# Patient Record
Sex: MALE | Race: WHITE | Employment: FULL TIME | URBAN - NONMETROPOLITAN AREA
[De-identification: names, ages, dates, MRNs, and addresses within clinical notes are randomized per-mention and may not be internally consistent; named-entity substitution may affect disease eponyms.]

---

## 2022-10-15 ENCOUNTER — APPOINTMENT (OUTPATIENT)
Dept: CT IMAGING | Age: 62
End: 2022-10-15
Payer: COMMERCIAL

## 2022-10-15 ENCOUNTER — APPOINTMENT (OUTPATIENT)
Dept: GENERAL RADIOLOGY | Age: 62
End: 2022-10-15
Payer: COMMERCIAL

## 2022-10-15 ENCOUNTER — HOSPITAL ENCOUNTER (EMERGENCY)
Age: 62
Discharge: HOME | End: 2022-10-15
Attending: EMERGENCY MEDICINE
Payer: COMMERCIAL

## 2022-10-15 VITALS
OXYGEN SATURATION: 98 % | HEART RATE: 58 BPM | BODY MASS INDEX: 23.54 KG/M2 | SYSTOLIC BLOOD PRESSURE: 154 MMHG | TEMPERATURE: 98 F | HEIGHT: 67 IN | WEIGHT: 150 LBS | RESPIRATION RATE: 15 BRPM | DIASTOLIC BLOOD PRESSURE: 89 MMHG

## 2022-10-15 DIAGNOSIS — R10.9 FLANK PAIN: Primary | ICD-10-CM

## 2022-10-15 LAB
ANION GAP SERPL CALCULATED.3IONS-SCNC: 11 MEQ/L (ref 8–16)
BASOPHILS # BLD: 0.5 %
BASOPHILS ABSOLUTE: 0 THOU/MM3 (ref 0–0.1)
BILIRUBIN URINE: NEGATIVE
BLOOD, URINE: NEGATIVE
BUN BLDV-MCNC: 7 MG/DL (ref 7–22)
CALCIUM SERPL-MCNC: 9.1 MG/DL (ref 8.5–10.5)
CHARACTER, URINE: CLEAR
CHLORIDE BLD-SCNC: 102 MEQ/L (ref 98–111)
CO2: 25 MEQ/L (ref 23–33)
COLOR: YELLOW
CREAT SERPL-MCNC: 0.5 MG/DL (ref 0.4–1.2)
EOSINOPHIL # BLD: 1.9 %
EOSINOPHILS ABSOLUTE: 0.1 THOU/MM3 (ref 0–0.4)
ERYTHROCYTE [DISTWIDTH] IN BLOOD BY AUTOMATED COUNT: 14.1 % (ref 11.5–14.5)
ERYTHROCYTE [DISTWIDTH] IN BLOOD BY AUTOMATED COUNT: 47.3 FL (ref 35–45)
GFR SERPL CREATININE-BSD FRML MDRD: > 90 ML/MIN/1.73M2
GLUCOSE BLD-MCNC: 131 MG/DL (ref 70–108)
GLUCOSE URINE: NEGATIVE MG/DL
HCT VFR BLD CALC: 46.7 % (ref 42–52)
HEMOGLOBIN: 16.2 GM/DL (ref 14–18)
IMMATURE GRANS (ABS): 0.02 THOU/MM3 (ref 0–0.07)
IMMATURE GRANULOCYTES: 0.3 %
KETONES, URINE: NEGATIVE
LEUKOCYTE ESTERASE, URINE: NEGATIVE
LYMPHOCYTES # BLD: 19.6 %
LYMPHOCYTES ABSOLUTE: 1.4 THOU/MM3 (ref 1–4.8)
MCH RBC QN AUTO: 31.5 PG (ref 26–33)
MCHC RBC AUTO-ENTMCNC: 34.7 GM/DL (ref 32.2–35.5)
MCV RBC AUTO: 90.9 FL (ref 80–94)
MONOCYTES # BLD: 10.5 %
MONOCYTES ABSOLUTE: 0.8 THOU/MM3 (ref 0.4–1.3)
NITRITE, URINE: NEGATIVE
NUCLEATED RED BLOOD CELLS: 0 /100 WBC
OSMOLALITY CALCULATION: 275.5 MOSMOL/KG (ref 275–300)
PH UA: 8 (ref 5–9)
PLATELET # BLD: 208 THOU/MM3 (ref 130–400)
PMV BLD AUTO: 10.9 FL (ref 9.4–12.4)
POTASSIUM REFLEX MAGNESIUM: 4 MEQ/L (ref 3.5–5.2)
PRO-BNP: 201.2 PG/ML (ref 0–900)
PROTEIN UA: NEGATIVE
RBC # BLD: 5.14 MILL/MM3 (ref 4.7–6.1)
SEG NEUTROPHILS: 67.2 %
SEGMENTED NEUTROPHILS ABSOLUTE COUNT: 4.9 THOU/MM3 (ref 1.8–7.7)
SODIUM BLD-SCNC: 138 MEQ/L (ref 135–145)
SPECIFIC GRAVITY, URINE: 1.01 (ref 1–1.03)
TROPONIN T: < 0.01 NG/ML
UROBILINOGEN, URINE: 0.2 EU/DL (ref 0–1)
WBC # BLD: 7.3 THOU/MM3 (ref 4.8–10.8)

## 2022-10-15 PROCEDURE — 96374 THER/PROPH/DIAG INJ IV PUSH: CPT

## 2022-10-15 PROCEDURE — 80048 BASIC METABOLIC PNL TOTAL CA: CPT

## 2022-10-15 PROCEDURE — 71045 X-RAY EXAM CHEST 1 VIEW: CPT

## 2022-10-15 PROCEDURE — 99285 EMERGENCY DEPT VISIT HI MDM: CPT

## 2022-10-15 PROCEDURE — 6360000002 HC RX W HCPCS: Performed by: STUDENT IN AN ORGANIZED HEALTH CARE EDUCATION/TRAINING PROGRAM

## 2022-10-15 PROCEDURE — 96372 THER/PROPH/DIAG INJ SC/IM: CPT

## 2022-10-15 PROCEDURE — 81003 URINALYSIS AUTO W/O SCOPE: CPT

## 2022-10-15 PROCEDURE — 6360000004 HC RX CONTRAST MEDICATION: Performed by: EMERGENCY MEDICINE

## 2022-10-15 PROCEDURE — 83880 ASSAY OF NATRIURETIC PEPTIDE: CPT

## 2022-10-15 PROCEDURE — 6370000000 HC RX 637 (ALT 250 FOR IP): Performed by: STUDENT IN AN ORGANIZED HEALTH CARE EDUCATION/TRAINING PROGRAM

## 2022-10-15 PROCEDURE — 84484 ASSAY OF TROPONIN QUANT: CPT

## 2022-10-15 PROCEDURE — 85025 COMPLETE CBC W/AUTO DIFF WBC: CPT

## 2022-10-15 PROCEDURE — 96376 TX/PRO/DX INJ SAME DRUG ADON: CPT

## 2022-10-15 PROCEDURE — 71275 CT ANGIOGRAPHY CHEST: CPT

## 2022-10-15 PROCEDURE — 96375 TX/PRO/DX INJ NEW DRUG ADDON: CPT

## 2022-10-15 PROCEDURE — 2580000003 HC RX 258: Performed by: STUDENT IN AN ORGANIZED HEALTH CARE EDUCATION/TRAINING PROGRAM

## 2022-10-15 RX ORDER — LIDOCAINE 4 G/G
1 PATCH TOPICAL DAILY
Qty: 30 PATCH | Refills: 0 | Status: SHIPPED | OUTPATIENT
Start: 2022-10-15 | End: 2022-11-14

## 2022-10-15 RX ORDER — MORPHINE SULFATE 4 MG/ML
4 INJECTION, SOLUTION INTRAMUSCULAR; INTRAVENOUS ONCE
Status: COMPLETED | OUTPATIENT
Start: 2022-10-15 | End: 2022-10-15

## 2022-10-15 RX ORDER — TIZANIDINE 2 MG/1
2 TABLET ORAL 3 TIMES DAILY PRN
Qty: 30 TABLET | Refills: 0 | Status: SHIPPED | OUTPATIENT
Start: 2022-10-15

## 2022-10-15 RX ORDER — 0.9 % SODIUM CHLORIDE 0.9 %
500 INTRAVENOUS SOLUTION INTRAVENOUS ONCE
Status: COMPLETED | OUTPATIENT
Start: 2022-10-15 | End: 2022-10-15

## 2022-10-15 RX ORDER — KETOROLAC TROMETHAMINE 30 MG/ML
30 INJECTION, SOLUTION INTRAMUSCULAR; INTRAVENOUS ONCE
Status: COMPLETED | OUTPATIENT
Start: 2022-10-15 | End: 2022-10-15

## 2022-10-15 RX ORDER — DEXAMETHASONE SODIUM PHOSPHATE 4 MG/ML
8 INJECTION, SOLUTION INTRA-ARTICULAR; INTRALESIONAL; INTRAMUSCULAR; INTRAVENOUS; SOFT TISSUE ONCE
Status: COMPLETED | OUTPATIENT
Start: 2022-10-15 | End: 2022-10-15

## 2022-10-15 RX ORDER — LIDOCAINE 4 G/G
1 PATCH TOPICAL DAILY
Status: DISCONTINUED | OUTPATIENT
Start: 2022-10-15 | End: 2022-10-15 | Stop reason: HOSPADM

## 2022-10-15 RX ORDER — ORPHENADRINE CITRATE 30 MG/ML
60 INJECTION INTRAMUSCULAR; INTRAVENOUS ONCE
Status: COMPLETED | OUTPATIENT
Start: 2022-10-15 | End: 2022-10-15

## 2022-10-15 RX ADMIN — DEXAMETHASONE SODIUM PHOSPHATE 8 MG: 4 INJECTION, SOLUTION INTRA-ARTICULAR; INTRALESIONAL; INTRAMUSCULAR; INTRAVENOUS; SOFT TISSUE at 20:08

## 2022-10-15 RX ADMIN — MORPHINE SULFATE 4 MG: 4 INJECTION, SOLUTION INTRAMUSCULAR; INTRAVENOUS at 20:13

## 2022-10-15 RX ADMIN — MORPHINE SULFATE 4 MG: 4 INJECTION, SOLUTION INTRAMUSCULAR; INTRAVENOUS at 18:54

## 2022-10-15 RX ADMIN — ORPHENADRINE CITRATE 60 MG: 30 INJECTION INTRAMUSCULAR; INTRAVENOUS at 20:09

## 2022-10-15 RX ADMIN — IOPAMIDOL 80 ML: 755 INJECTION, SOLUTION INTRAVENOUS at 19:30

## 2022-10-15 RX ADMIN — SODIUM CHLORIDE 500 ML: 9 INJECTION, SOLUTION INTRAVENOUS at 18:46

## 2022-10-15 RX ADMIN — KETOROLAC TROMETHAMINE 30 MG: 30 INJECTION, SOLUTION INTRAMUSCULAR; INTRAVENOUS at 20:10

## 2022-10-15 ASSESSMENT — ENCOUNTER SYMPTOMS
NAUSEA: 0
DIARRHEA: 0
SHORTNESS OF BREATH: 0
CONSTIPATION: 0
BACK PAIN: 1
PHOTOPHOBIA: 0
VOMITING: 0
ABDOMINAL PAIN: 0
ABDOMINAL DISTENTION: 0
COLOR CHANGE: 0
CHEST TIGHTNESS: 0

## 2022-10-15 ASSESSMENT — PAIN - FUNCTIONAL ASSESSMENT: PAIN_FUNCTIONAL_ASSESSMENT: 0-10

## 2022-10-15 ASSESSMENT — PAIN SCALES - GENERAL: PAINLEVEL_OUTOF10: 5

## 2022-10-15 NOTE — ED PROVIDER NOTES
[place] ENCOUNTER          Pt Name: Bonifacio Moyer  MRN: [id number]  [place] 1960  Date of evaluation: 10/15/2022  Treating Resident Physician: Mercy Obrien MD  Supervising Physician: Dana Cha COMPLAINT       Chief Complaint   Patient presents with    Flank Pain     Left side     History obtained from the patient. HISTORY OF PRESENT ILLNESS    HPI  Bonifacio Moyer is a 58 y.o. male who presents to the emergency department for evaluation of left flank pain. Patient states that he has a very physical job, notes that he had the left flank/back pain yesterday and today. States that pain is currently 5 out of 10, worse with any movement, not associated with any dysuria, frequency, urgency, pain is nonradiating. Pain has made moving around at home difficult. Patient is chronic smoker, does not regularly see the doctor. Does not know if he has any medical problems. The patient has no other acute complaints at this time. REVIEW OF SYSTEMS   Review of Systems   Constitutional:  Negative for fatigue and fever. HENT: Negative. Eyes:  Negative for photophobia and visual disturbance. Respiratory:  Negative for chest tightness and shortness of breath. Cardiovascular:  Negative for chest pain, palpitations and leg swelling. Gastrointestinal:  Negative for abdominal distention, abdominal pain, constipation, diarrhea, nausea and vomiting. Endocrine: Negative for polyphagia and polyuria. Genitourinary:  Positive for flank pain. Musculoskeletal:  Positive for back pain. Negative for arthralgias, gait problem, joint swelling, myalgias, neck pain and neck stiffness. Skin:  Negative for color change, rash and wound. Neurological:  Negative for dizziness, syncope, weakness, light-headedness and numbness. Hematological:  Negative for adenopathy. Does not bruise/bleed easily.         PAST MEDICAL AND SURGICAL HISTORY   History reviewed. No pertinent past medical history. History reviewed. No pertinent surgical history. MEDICATIONS   No current facility-administered medications for this encounter. Current Outpatient Medications:     tiZANidine (ZANAFLEX) 2 MG tablet, Take 1 tablet by mouth 3 times daily as needed (back pain), Disp: 30 tablet, Rfl: 0    lidocaine 4 % external patch, Place 1 patch onto the skin daily, Disp: 30 patch, Rfl: 0      SOCIAL HISTORY     Social History     Social History Narrative    Not on file     Social History     Tobacco Use    Smoking status: Every Day     Packs/day: 0.50     Types: Cigarettes   Substance Use Topics    Alcohol use: Yes     Alcohol/week: 3.0 standard drinks     Types: 3 Cans of beer per week     Comment: daily         ALLERGIES   No Known Allergies      FAMILY HISTORY   History reviewed. No pertinent family history. PREVIOUS RECORDS   Previous records reviewed: Medical, past surgical, medications, allergies        PHYSICAL EXAM     ED Triage Vitals [10/15/22 1811]   BP Temp Temp Source Heart Rate Resp SpO2 Height Weight   (!) 148/74 98 °F (36.7 °C) Oral 74 14 99 % 5' 7\" (1.702 m) 150 lb (68 kg)     Initial vital signs and nursing assessment reviewed and hypertensive. Body mass index is 23.49 kg/m². Pulsoximetry is normal per my interpretation. Additional Vital Signs:  Vitals:    10/15/22 1924   BP: (!) 154/89   Pulse: 58   Resp: 15   Temp:    SpO2: 98%       Physical Exam  Constitutional:       Appearance: Normal appearance. HENT:      Head: Normocephalic and atraumatic. Right Ear: External ear normal.      Left Ear: External ear normal.      Nose: Nose normal.      Mouth/Throat:      Mouth: Mucous membranes are moist.      Pharynx: Oropharynx is clear. Eyes:      Extraocular Movements: Extraocular movements intact. Conjunctiva/sclera: Conjunctivae normal.      Pupils: Pupils are equal, round, and reactive to light.    Cardiovascular:      Rate and Rhythm: Normal rate and regular rhythm. Pulses: Normal pulses. Heart sounds: Normal heart sounds. Pulmonary:      Effort: Pulmonary effort is normal. No respiratory distress. Breath sounds: Normal breath sounds. No wheezing or rales. Chest:      Chest wall: No tenderness. Abdominal:      General: Abdomen is flat. Bowel sounds are normal. There is no distension. Palpations: Abdomen is soft. There is no mass. Tenderness: There is no abdominal tenderness. There is no right CVA tenderness, left CVA tenderness, guarding or rebound. Musculoskeletal:         General: Normal range of motion. Cervical back: Normal range of motion and neck supple. Right lower leg: No edema. Left lower leg: No edema. Skin:     General: Skin is warm and dry. Capillary Refill: Capillary refill takes less than 2 seconds. Findings: No rash. Neurological:      General: No focal deficit present. Mental Status: He is alert and oriented to person, place, and time. Gait: Gait normal.            MEDICAL DECISION MAKING   Initial Assessment:   Is a 49-year-old male, presenting for left-sided back pain, smoker, removed from medical care. Physical exam significant for hypertension, no CVA tenderness. Differential diagnoses include not limited to nephrolithiasis, pyelonephritis, muscle strain, aortic dissection, PE  Plan:   CBC, BMP, troponin, EKG, CTA chest  Labs grossly unremarkable. CTA chest shows old fracture of the left in the posterior rib, which is exactly where the patient is pointing to saying the worst of his pain is. Pain resolved after Toradol, Norflex, Decadron, lidocaine patch  Discharged home with strict return precautions, follow-up.         ED RESULTS   Laboratory results:  Labs Reviewed   CBC WITH AUTO DIFFERENTIAL - Abnormal; Notable for the following components:       Result Value    RDW-SD 47.3 (*)     All other components within normal limits   BASIC METABOLIC PANEL W/ REFLEX TO MG FOR LOW K - Abnormal; Notable for the following components:    Glucose 131 (*)     All other components within normal limits   URINALYSIS WITH REFLEX TO CULTURE   BRAIN NATRIURETIC PEPTIDE   TROPONIN   ANION GAP   GLOMERULAR FILTRATION RATE, ESTIMATED   OSMOLALITY       Radiologic studies results:  CTA CHEST W WO CONTRAST - r/o Aortic Dissection   Final Result       1. There is an old appearing partially healed fracture of the left 11th rib. 2. There is no aortic aneurysm or dissection. 3. No pulmonary embolism or infiltrate. **This report has been created using voice recognition software. It may contain minor errors which are inherent in voice recognition technology. **      Final report electronically signed by Dr Starr Suarez on 10/15/2022 7:47 PM      XR CHEST PORTABLE   Final Result   There is no acute intrathoracic process. **This report has been created using voice recognition software. It may contain minor errors which are inherent in voice recognition technology. **      Final report electronically signed by Dr Starr Suarez on 10/15/2022 6:52 PM          ED Medications administered this visit:   Medications   0.9 % sodium chloride bolus (0 mLs IntraVENous Stopped 10/15/22 2015)   morphine injection 4 mg (4 mg IntraVENous Given 10/15/22 1854)   iopamidol (ISOVUE-370) 76 % injection 80 mL (80 mLs IntraVENous Given 10/15/22 1930)   ketorolac (TORADOL) injection 30 mg (30 mg IntraVENous Given 10/15/22 2010)   orphenadrine (NORFLEX) injection 60 mg (60 mg IntraMUSCular Given 10/15/22 2009)   dexamethasone (DECADRON) injection 8 mg (8 mg IntraVENous Given 10/15/22 2008)   morphine injection 4 mg (4 mg IntraVENous Given 10/15/22 2013)         ED COURSE         Strict return precautions and follow up instructions were discussed with the patient prior to discharge, with which the patient agrees.       MEDICATION CHANGES     Discharge Medication

## 2022-10-15 NOTE — ED TRIAGE NOTES
Pt to the ED via lobby with complaint of left flank pain that started yesterday about noon. Pt stated he took Asprin a few times today that helped a little with the pain. Pt stated that yesterday he bent over to tie his shoes a vomited. Pt alert and oriented x3.

## 2022-10-15 NOTE — ED NOTES
Pt resting quietly in room no needs expressed. Side rails up x2 with call light in reach.        Gabriel Crow RN  10/15/22 1925

## 2022-10-16 NOTE — DISCHARGE INSTRUCTIONS
Seen for flank pain. At this time you are stable for discharge. Please follow-up with your primary care provider within the next couple days. Please return to the ED if any worsening of condition.

## 2022-10-19 NOTE — ED PROVIDER NOTES
[address], Pt Name: Selwyn Sun  MRN: [id number]  [place] 1960  Date of evaluation: 9/12/20      I personally saw and examined the patient. I have reviewed and agree with the Resident findings, including all diagnostic interpretations and treatment plans as written. I was present for the key portion of any procedures performed and the inclusive time noted in any critical care statement. History: This patient was seen with Vidal Bhatia, resident physician. This Is a 75-year-old male with very localized pain over the left posterior chest.  We obtained CT imaging to rule out dissection there is no evidence of dissection and what was found was a partially healed fracture of the left 11th rib and that appears to correlate with exactly where the patient's pain is. Is very localized there. 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 But in any case has no evidence of pneumothorax or any acute surgical emergency on his imaging. We have addressed his pain and will be discharged.               Leticia Wesley,   10/19/22 6639

## 2023-01-27 ENCOUNTER — APPOINTMENT (OUTPATIENT)
Dept: GENERAL RADIOLOGY | Age: 63
End: 2023-01-27
Payer: COMMERCIAL

## 2023-01-27 ENCOUNTER — APPOINTMENT (OUTPATIENT)
Dept: CT IMAGING | Age: 63
End: 2023-01-27
Payer: COMMERCIAL

## 2023-01-27 ENCOUNTER — HOSPITAL ENCOUNTER (INPATIENT)
Age: 63
LOS: 1 days | Discharge: HOME | End: 2023-01-28
Attending: EMERGENCY MEDICINE | Admitting: INTERNAL MEDICINE
Payer: COMMERCIAL

## 2023-01-27 DIAGNOSIS — I63.512 CEREBROVASCULAR ACCIDENT (CVA) DUE TO STENOSIS OF LEFT MIDDLE CEREBRAL ARTERY (HCC): Primary | ICD-10-CM

## 2023-01-27 DIAGNOSIS — I66.9 STENOSIS OF INTRACRANIAL VESSEL: ICD-10-CM

## 2023-01-27 PROBLEM — I63.9 ACUTE CVA (CEREBROVASCULAR ACCIDENT) (HCC): Status: ACTIVE | Noted: 2023-01-27

## 2023-01-27 LAB
ALBUMIN SERPL BCG-MCNC: 4.4 G/DL (ref 3.5–5.1)
ALP SERPL-CCNC: 67 U/L (ref 38–126)
ALT SERPL W/O P-5'-P-CCNC: 18 U/L (ref 11–66)
ANION GAP SERPL CALC-SCNC: 13 MEQ/L (ref 8–16)
AST SERPL-CCNC: 27 U/L (ref 5–40)
BASOPHILS ABSOLUTE: 0.1 THOU/MM3 (ref 0–0.1)
BASOPHILS NFR BLD AUTO: 0.8 %
BILIRUB SERPL-MCNC: 0.5 MG/DL (ref 0.3–1.2)
BUN SERPL-MCNC: 12 MG/DL (ref 7–22)
CALCIUM SERPL-MCNC: 9.4 MG/DL (ref 8.5–10.5)
CHLORIDE SERPL-SCNC: 104 MEQ/L (ref 98–111)
CO2 SERPL-SCNC: 24 MEQ/L (ref 23–33)
CREAT SERPL-MCNC: 0.5 MG/DL (ref 0.4–1.2)
DEPRECATED RDW RBC AUTO: 43.9 FL (ref 35–45)
EOSINOPHIL NFR BLD AUTO: 6.4 %
EOSINOPHILS ABSOLUTE: 0.5 THOU/MM3 (ref 0–0.4)
ERYTHROCYTE [DISTWIDTH] IN BLOOD BY AUTOMATED COUNT: 13.1 % (ref 11.5–14.5)
GFR SERPL CREATININE-BSD FRML MDRD: > 60 ML/MIN/1.73M2
GLUCOSE BLD STRIP.AUTO-MCNC: 206 MG/DL (ref 70–108)
GLUCOSE SERPL-MCNC: 163 MG/DL (ref 70–108)
HCT VFR BLD AUTO: 43.9 % (ref 42–52)
HGB BLD-MCNC: 14.7 GM/DL (ref 14–18)
IMM GRANULOCYTES # BLD AUTO: 0.01 THOU/MM3 (ref 0–0.07)
IMM GRANULOCYTES NFR BLD AUTO: 0.1 %
INR PPP: 0.86 (ref 0.85–1.13)
LYMPHOCYTES ABSOLUTE: 2.7 THOU/MM3 (ref 1–4.8)
LYMPHOCYTES NFR BLD AUTO: 36.1 %
MCH RBC QN AUTO: 30.6 PG (ref 26–33)
MCHC RBC AUTO-ENTMCNC: 33.5 GM/DL (ref 32.2–35.5)
MCV RBC AUTO: 91.5 FL (ref 80–94)
MONOCYTES ABSOLUTE: 0.8 THOU/MM3 (ref 0.4–1.3)
MONOCYTES NFR BLD AUTO: 10.2 %
NEUTROPHILS NFR BLD AUTO: 46.4 %
NRBC BLD AUTO-RTO: 0 /100 WBC
OSMOLALITY SERPL CALC.SUM OF ELEC: 284.6 MOSMOL/KG (ref 275–300)
PLATELET # BLD AUTO: 179 THOU/MM3 (ref 130–400)
PMV BLD AUTO: 11 FL (ref 9.4–12.4)
POC CREATININE WHOLE BLOOD: 0.6 MG/DL (ref 0.5–1.2)
POTASSIUM SERPL-SCNC: 3.8 MEQ/L (ref 3.5–5.2)
PROT SERPL-MCNC: 6.7 G/DL (ref 6.1–8)
RBC # BLD AUTO: 4.8 MILL/MM3 (ref 4.7–6.1)
SEGMENTED NEUTROPHILS ABSOLUTE COUNT: 3.4 THOU/MM3 (ref 1.8–7.7)
SODIUM SERPL-SCNC: 141 MEQ/L (ref 135–145)
TROPONIN T: < 0.01 NG/ML
WBC # BLD AUTO: 7.4 THOU/MM3 (ref 4.8–10.8)

## 2023-01-27 PROCEDURE — 84484 ASSAY OF TROPONIN QUANT: CPT

## 2023-01-27 PROCEDURE — 70496 CT ANGIOGRAPHY HEAD: CPT

## 2023-01-27 PROCEDURE — 85025 COMPLETE CBC W/AUTO DIFF WBC: CPT

## 2023-01-27 PROCEDURE — 6370000000 HC RX 637 (ALT 250 FOR IP): Performed by: EMERGENCY MEDICINE

## 2023-01-27 PROCEDURE — 82565 ASSAY OF CREATININE: CPT

## 2023-01-27 PROCEDURE — 99285 EMERGENCY DEPT VISIT HI MDM: CPT

## 2023-01-27 PROCEDURE — 80053 COMPREHEN METABOLIC PANEL: CPT

## 2023-01-27 PROCEDURE — 70450 CT HEAD/BRAIN W/O DYE: CPT

## 2023-01-27 PROCEDURE — 82948 REAGENT STRIP/BLOOD GLUCOSE: CPT

## 2023-01-27 PROCEDURE — 85610 PROTHROMBIN TIME: CPT

## 2023-01-27 PROCEDURE — 36415 COLL VENOUS BLD VENIPUNCTURE: CPT

## 2023-01-27 PROCEDURE — 6360000004 HC RX CONTRAST MEDICATION: Performed by: EMERGENCY MEDICINE

## 2023-01-27 PROCEDURE — 93005 ELECTROCARDIOGRAM TRACING: CPT | Performed by: EMERGENCY MEDICINE

## 2023-01-27 PROCEDURE — 2580000003 HC RX 258: Performed by: EMERGENCY MEDICINE

## 2023-01-27 PROCEDURE — 71045 X-RAY EXAM CHEST 1 VIEW: CPT

## 2023-01-27 PROCEDURE — 70498 CT ANGIOGRAPHY NECK: CPT

## 2023-01-27 PROCEDURE — 2060000000 HC ICU INTERMEDIATE R&B

## 2023-01-27 RX ORDER — ACETAMINOPHEN 650 MG/1
650 SUPPOSITORY RECTAL EVERY 4 HOURS PRN
Status: DISCONTINUED | OUTPATIENT
Start: 2023-01-27 | End: 2023-01-28 | Stop reason: HOSPADM

## 2023-01-27 RX ORDER — 0.9 % SODIUM CHLORIDE 0.9 %
1000 INTRAVENOUS SOLUTION INTRAVENOUS ONCE
Status: COMPLETED | OUTPATIENT
Start: 2023-01-27 | End: 2023-01-27

## 2023-01-27 RX ORDER — POLYETHYLENE GLYCOL 3350 17 G/17G
17 POWDER, FOR SOLUTION ORAL DAILY PRN
Status: DISCONTINUED | OUTPATIENT
Start: 2023-01-27 | End: 2023-01-28 | Stop reason: HOSPADM

## 2023-01-27 RX ORDER — ACETAMINOPHEN 325 MG/1
650 TABLET ORAL EVERY 4 HOURS PRN
Status: DISCONTINUED | OUTPATIENT
Start: 2023-01-27 | End: 2023-01-28 | Stop reason: HOSPADM

## 2023-01-27 RX ORDER — CLOPIDOGREL BISULFATE 75 MG/1
75 TABLET ORAL DAILY
Status: DISCONTINUED | OUTPATIENT
Start: 2023-01-28 | End: 2023-01-28 | Stop reason: HOSPADM

## 2023-01-27 RX ORDER — ATORVASTATIN CALCIUM 80 MG/1
80 TABLET, FILM COATED ORAL NIGHTLY
Status: DISCONTINUED | OUTPATIENT
Start: 2023-01-28 | End: 2023-01-28 | Stop reason: HOSPADM

## 2023-01-27 RX ORDER — ONDANSETRON 4 MG/1
4 TABLET, ORALLY DISINTEGRATING ORAL EVERY 8 HOURS PRN
Status: DISCONTINUED | OUTPATIENT
Start: 2023-01-27 | End: 2023-01-28 | Stop reason: HOSPADM

## 2023-01-27 RX ORDER — ASPIRIN 81 MG/1
324 TABLET, CHEWABLE ORAL ONCE
Status: COMPLETED | OUTPATIENT
Start: 2023-01-27 | End: 2023-01-27

## 2023-01-27 RX ORDER — CLOPIDOGREL 300 MG/1
300 TABLET, FILM COATED ORAL ONCE
Status: COMPLETED | OUTPATIENT
Start: 2023-01-27 | End: 2023-01-27

## 2023-01-27 RX ORDER — SODIUM CHLORIDE 9 MG/ML
INJECTION, SOLUTION INTRAVENOUS CONTINUOUS
Status: DISCONTINUED | OUTPATIENT
Start: 2023-01-28 | End: 2023-01-28 | Stop reason: HOSPADM

## 2023-01-27 RX ORDER — ENOXAPARIN SODIUM 100 MG/ML
40 INJECTION SUBCUTANEOUS DAILY
Status: DISCONTINUED | OUTPATIENT
Start: 2023-01-28 | End: 2023-01-28 | Stop reason: HOSPADM

## 2023-01-27 RX ORDER — ONDANSETRON 2 MG/ML
4 INJECTION INTRAMUSCULAR; INTRAVENOUS EVERY 6 HOURS PRN
Status: DISCONTINUED | OUTPATIENT
Start: 2023-01-27 | End: 2023-01-28 | Stop reason: HOSPADM

## 2023-01-27 RX ADMIN — CLOPIDOGREL BISULFATE 300 MG: 300 TABLET, FILM COATED ORAL at 21:48

## 2023-01-27 RX ADMIN — IOPAMIDOL 80 ML: 755 INJECTION, SOLUTION INTRAVENOUS at 21:34

## 2023-01-27 RX ADMIN — ASPIRIN 81 MG 324 MG: 81 TABLET ORAL at 21:48

## 2023-01-27 RX ADMIN — SODIUM CHLORIDE 1000 ML: 9 INJECTION, SOLUTION INTRAVENOUS at 21:30

## 2023-01-27 ASSESSMENT — ENCOUNTER SYMPTOMS
RHINORRHEA: 1
EYES NEGATIVE: 1
COUGH: 1
SINUS PRESSURE: 1
GASTROINTESTINAL NEGATIVE: 1
ALLERGIC/IMMUNOLOGIC NEGATIVE: 1

## 2023-01-27 ASSESSMENT — PAIN - FUNCTIONAL ASSESSMENT: PAIN_FUNCTIONAL_ASSESSMENT: NONE - DENIES PAIN

## 2023-01-28 ENCOUNTER — APPOINTMENT (OUTPATIENT)
Dept: MRI IMAGING | Age: 63
End: 2023-01-28
Payer: COMMERCIAL

## 2023-01-28 VITALS
HEART RATE: 75 BPM | RESPIRATION RATE: 16 BRPM | HEIGHT: 67 IN | DIASTOLIC BLOOD PRESSURE: 86 MMHG | OXYGEN SATURATION: 96 % | TEMPERATURE: 98.4 F | WEIGHT: 165.7 LBS | BODY MASS INDEX: 26.01 KG/M2 | SYSTOLIC BLOOD PRESSURE: 128 MMHG

## 2023-01-28 PROBLEM — I63.9 ACUTE CVA (CEREBROVASCULAR ACCIDENT) (HCC): Status: RESOLVED | Noted: 2023-01-27 | Resolved: 2023-01-28

## 2023-01-28 PROBLEM — G45.9 TIA (TRANSIENT ISCHEMIC ATTACK): Status: ACTIVE | Noted: 2023-01-28

## 2023-01-28 LAB
CHOLEST SERPL-MCNC: 185 MG/DL (ref 100–199)
CRP SERPL-MCNC: < 0.3 MG/DL (ref 0–1)
DEPRECATED MEAN GLUCOSE BLD GHB EST-ACNC: 111 MG/DL (ref 70–126)
DEPRECATED RDW RBC AUTO: 45.9 FL (ref 35–45)
EKG ATRIAL RATE: 83 BPM
EKG P AXIS: 12 DEGREES
EKG P-R INTERVAL: 144 MS
EKG Q-T INTERVAL: 390 MS
EKG QRS DURATION: 86 MS
EKG QTC CALCULATION (BAZETT): 458 MS
EKG R AXIS: 18 DEGREES
EKG T AXIS: 65 DEGREES
EKG VENTRICULAR RATE: 83 BPM
ERYTHROCYTE [DISTWIDTH] IN BLOOD BY AUTOMATED COUNT: 13.2 % (ref 11.5–14.5)
ERYTHROCYTE [SEDIMENTATION RATE] IN BLOOD BY WESTERGREN METHOD: 3 MM/HR (ref 0–10)
FLUAV RNA RESP QL NAA+PROBE: NOT DETECTED
FLUBV RNA RESP QL NAA+PROBE: NOT DETECTED
GLUCOSE BLD STRIP.AUTO-MCNC: 112 MG/DL (ref 70–108)
GLUCOSE BLD STRIP.AUTO-MCNC: 133 MG/DL (ref 70–108)
GLUCOSE BLD STRIP.AUTO-MCNC: 157 MG/DL (ref 70–108)
GLUCOSE BLD STRIP.AUTO-MCNC: 96 MG/DL (ref 70–108)
HBA1C MFR BLD HPLC: 5.7 % (ref 4.4–6.4)
HCT VFR BLD AUTO: 42.9 % (ref 42–52)
HDLC SERPL-MCNC: 97 MG/DL
HGB BLD-MCNC: 13.7 GM/DL (ref 14–18)
IRON SERPL-MCNC: 96 UG/DL (ref 65–195)
LDLC SERPL CALC-MCNC: 75 MG/DL
LV EF: 63 %
LVEF MODALITY: NORMAL
MCH RBC QN AUTO: 30.4 PG (ref 26–33)
MCHC RBC AUTO-ENTMCNC: 31.9 GM/DL (ref 32.2–35.5)
MCV RBC AUTO: 95.3 FL (ref 80–94)
PLATELET # BLD AUTO: 140 THOU/MM3 (ref 130–400)
PMV BLD AUTO: 10.5 FL (ref 9.4–12.4)
RBC # BLD AUTO: 4.5 MILL/MM3 (ref 4.7–6.1)
SARS-COV-2 RNA RESP QL NAA+PROBE: NOT DETECTED
TIBC SERPL-MCNC: 305 UG/DL (ref 171–450)
TRIGL SERPL-MCNC: 65 MG/DL (ref 0–199)
WBC # BLD AUTO: 5.3 THOU/MM3 (ref 4.8–10.8)

## 2023-01-28 PROCEDURE — 92523 SPEECH SOUND LANG COMPREHEN: CPT

## 2023-01-28 PROCEDURE — 83550 IRON BINDING TEST: CPT

## 2023-01-28 PROCEDURE — 36415 COLL VENOUS BLD VENIPUNCTURE: CPT

## 2023-01-28 PROCEDURE — 97535 SELF CARE MNGMENT TRAINING: CPT

## 2023-01-28 PROCEDURE — 85027 COMPLETE CBC AUTOMATED: CPT

## 2023-01-28 PROCEDURE — 83516 IMMUNOASSAY NONANTIBODY: CPT

## 2023-01-28 PROCEDURE — 82948 REAGENT STRIP/BLOOD GLUCOSE: CPT

## 2023-01-28 PROCEDURE — 87636 SARSCOV2 & INF A&B AMP PRB: CPT

## 2023-01-28 PROCEDURE — 83540 ASSAY OF IRON: CPT

## 2023-01-28 PROCEDURE — 93270 REMOTE 30 DAY ECG REV/REPORT: CPT

## 2023-01-28 PROCEDURE — 97530 THERAPEUTIC ACTIVITIES: CPT

## 2023-01-28 PROCEDURE — 97165 OT EVAL LOW COMPLEX 30 MIN: CPT

## 2023-01-28 PROCEDURE — 6360000002 HC RX W HCPCS: Performed by: PHYSICIAN ASSISTANT

## 2023-01-28 PROCEDURE — 86255 FLUORESCENT ANTIBODY SCREEN: CPT

## 2023-01-28 PROCEDURE — 6370000000 HC RX 637 (ALT 250 FOR IP): Performed by: PHYSICIAN ASSISTANT

## 2023-01-28 PROCEDURE — 6370000000 HC RX 637 (ALT 250 FOR IP): Performed by: NURSE PRACTITIONER

## 2023-01-28 PROCEDURE — 70551 MRI BRAIN STEM W/O DYE: CPT

## 2023-01-28 PROCEDURE — 80061 LIPID PANEL: CPT

## 2023-01-28 PROCEDURE — 83036 HEMOGLOBIN GLYCOSYLATED A1C: CPT

## 2023-01-28 PROCEDURE — 2580000003 HC RX 258: Performed by: PHYSICIAN ASSISTANT

## 2023-01-28 PROCEDURE — 93306 TTE W/DOPPLER COMPLETE: CPT

## 2023-01-28 PROCEDURE — 92610 EVALUATE SWALLOWING FUNCTION: CPT

## 2023-01-28 PROCEDURE — 85651 RBC SED RATE NONAUTOMATED: CPT

## 2023-01-28 PROCEDURE — 86038 ANTINUCLEAR ANTIBODIES: CPT

## 2023-01-28 PROCEDURE — 86140 C-REACTIVE PROTEIN: CPT

## 2023-01-28 RX ORDER — INSULIN LISPRO 100 [IU]/ML
0-4 INJECTION, SOLUTION INTRAVENOUS; SUBCUTANEOUS NIGHTLY
Status: DISCONTINUED | OUTPATIENT
Start: 2023-01-28 | End: 2023-01-28 | Stop reason: HOSPADM

## 2023-01-28 RX ORDER — ASPIRIN 81 MG/1
81 TABLET, CHEWABLE ORAL DAILY
Status: DISCONTINUED | OUTPATIENT
Start: 2023-01-28 | End: 2023-01-28

## 2023-01-28 RX ORDER — ASPIRIN 81 MG/1
324 TABLET, CHEWABLE ORAL DAILY
Status: DISCONTINUED | OUTPATIENT
Start: 2023-01-29 | End: 2023-01-28 | Stop reason: HOSPADM

## 2023-01-28 RX ORDER — CLOPIDOGREL BISULFATE 75 MG/1
75 TABLET ORAL DAILY
Qty: 30 TABLET | Refills: 3 | Status: SHIPPED | OUTPATIENT
Start: 2023-01-29

## 2023-01-28 RX ORDER — INSULIN LISPRO 100 [IU]/ML
0-4 INJECTION, SOLUTION INTRAVENOUS; SUBCUTANEOUS
Status: DISCONTINUED | OUTPATIENT
Start: 2023-01-28 | End: 2023-01-28 | Stop reason: HOSPADM

## 2023-01-28 RX ORDER — ATORVASTATIN CALCIUM 80 MG/1
80 TABLET, FILM COATED ORAL NIGHTLY
Qty: 30 TABLET | Refills: 3 | Status: SHIPPED | OUTPATIENT
Start: 2023-01-28

## 2023-01-28 RX ORDER — DEXTROSE MONOHYDRATE 100 MG/ML
INJECTION, SOLUTION INTRAVENOUS CONTINUOUS PRN
Status: DISCONTINUED | OUTPATIENT
Start: 2023-01-28 | End: 2023-01-28 | Stop reason: HOSPADM

## 2023-01-28 RX ORDER — ASPIRIN 81 MG/1
324 TABLET, CHEWABLE ORAL DAILY
Qty: 30 TABLET | Refills: 3 | Status: SHIPPED | OUTPATIENT
Start: 2023-01-29

## 2023-01-28 RX ORDER — NICOTINE 21 MG/24HR
1 PATCH, TRANSDERMAL 24 HOURS TRANSDERMAL DAILY
Status: DISCONTINUED | OUTPATIENT
Start: 2023-01-28 | End: 2023-01-28 | Stop reason: HOSPADM

## 2023-01-28 RX ORDER — ASPIRIN 81 MG/1
81 TABLET, CHEWABLE ORAL DAILY
Qty: 30 TABLET | Refills: 3 | Status: SHIPPED | OUTPATIENT
Start: 2023-01-29 | End: 2023-01-28 | Stop reason: HOSPADM

## 2023-01-28 RX ADMIN — ENOXAPARIN SODIUM 40 MG: 100 INJECTION SUBCUTANEOUS at 08:24

## 2023-01-28 RX ADMIN — ATORVASTATIN CALCIUM 80 MG: 80 TABLET, FILM COATED ORAL at 01:46

## 2023-01-28 RX ADMIN — CLOPIDOGREL BISULFATE 75 MG: 75 TABLET ORAL at 08:24

## 2023-01-28 RX ADMIN — ASPIRIN 81 MG: 81 TABLET, CHEWABLE ORAL at 10:37

## 2023-01-28 RX ADMIN — SODIUM CHLORIDE: 9 INJECTION, SOLUTION INTRAVENOUS at 01:50

## 2023-01-28 ASSESSMENT — ENCOUNTER SYMPTOMS
COUGH: 1
NAUSEA: 0
EYES NEGATIVE: 1
SORE THROAT: 0
PHOTOPHOBIA: 0
ALLERGIC/IMMUNOLOGIC NEGATIVE: 1
ABDOMINAL PAIN: 0
GASTROINTESTINAL NEGATIVE: 1
RHINORRHEA: 0
VOMITING: 0
RHINORRHEA: 1
COUGH: 0
SINUS PRESSURE: 1
SHORTNESS OF BREATH: 0

## 2023-01-28 ASSESSMENT — 9 HOLE PEG TEST
TESTTIME_SECONDS: 19.83
TEST_RESULT: FUNCTIONAL
TESTTIME_SECONDS: 26.6
TEST_RESULT: FUNCTIONAL

## 2023-01-28 NOTE — PROGRESS NOTES
[place] [address] 60  PHYSICAL THERAPY MISSED TREATMENT NOTE  [facility]    Date: 2023  Patient Name: Massimo Velez        MRN: [id number]   : 1960  (58 y.o.)  Gender: male            Missed Treat    REASON FOR MISSED TREATMENT:  Missed Treat. Per conversation with pt, RN, and OT following her evaluation, pt is moving at his baseline level of mobility. He denies PT needs, and is to be discharged soon. PT will defer evaluation and sign off at this time. Please place new orders if needs arise.      Timmy Gilford PT, DPT

## 2023-01-28 NOTE — PROGRESS NOTES
[facility] [place]  INPATIENT OCCUPATIONAL THERAPY  [facility] NEUROSCIENCES 4A  EVALUATION    Time:   Time In: 740  Time Out: 813  Timed Code Treatment Minutes: 23 Minutes  Minutes: 33          Date: 2023  Patient Name: Fozia Almazan,   Gender: male      MRN: [id number]  : 1960  (58 y.o.)  Referring Practitioner: Gloria De PA-C  Diagnosis: acute CVA  Additional Pertinent Hx: Patient is a 58 y.o. male with no past medical history who presents for evaluation of slurred speech. Patient states that he had right-sided numbness/weakness and facial droop today at 1000 which resolved after a couple hours and 650 mg home aspirin. He noticed a recurrence of symptoms at  which included mumbled speech, right-sided facial droop, and general weakness but no discernable sided extremity deficit. Restrictions/Precautions:  Restrictions/Precautions: Up as Tolerated, Fall Risk    Subjective  Chart Reviewed: Yes, Orders, Progress Notes    Subjective: Nurse approved OT evaluation. Pt in bed on OT arrival with significant other at bedside and supportive. Pt is alert and oriented x4, pleasant and cooperative.  Pt reports he feels like his symptoms have subsided    Pain: denies    Vitals: Vitals not assessed per clinical judgement, see nursing flowsheet    Social/Functional History:  Lives With: Family  Type of Home: House  Home Layout: Two level, Performs ADL's on one level  Home Access: Stairs to enter without rails  Entrance Stairs - Number of Steps: 2 SHAINA  Home Equipment: Pamela Sendy, rolling, Cane   Bathroom Shower/Tub: Walk-in shower  Bathroom Toilet: Handicap height  Bathroom Equipment: Grab bars in shower       ADL Assistance: Independent  Homemaking Assistance: Independent  Ambulation Assistance: Independent  Transfer Assistance: Independent    Active : Yes  Occupation: Full time employment  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 Ambulates without a device HH and community distances. VISION:WFL    HEARING:  WFL    COGNITION: WFL    RANGE OF MOTION:  Bilateral Upper Extremity:  WNL    STRENGTH:  Right Upper Extremity: WNL  Left Upper Extremity:  WNL    HAND ASSESSMENT    Hand Dominance: Right  Left Hand Strength -  (lbs)  Handle Setting 2: 1/28: 96#, 95#, 97#  Right Hand Strength -  (lbs)  Handle Setting 2: 1/28: 100#, 99#, 98#  Fine Motor Skills  Left 9-Hole Peg Test: Functional  Left 9 Hole Peg Test Time (secs): 26.6  Right 9-Hole Peg Test: Functional  Right 9 Hole Peg Test Time (secs): 19.83       SENSATION:   WFL, even on both sides    ADL:   Grooming: Independent. In standing at the sink   Bathing: Independent. In standing at the sink for sink bath   Upper Extremity Dressing: Independent. Lower Extremity Dressing: Independent. Footwear Management: Independent. Sitting EOB to don socks  Toileting: Independent. Toilet Transfer: Independent. Belén Castro BALANCE:  Sitting Balance:  Independent. Standing Balance: Modified Independent. BED MOBILITY:  Supine to Sit: Independent    Sit to Supine: Independent      TRANSFERS:  Sit to Stand:  Independent. Stand to Sit: Independent. FUNCTIONAL MOBILITY:  Assistive Device: None  Assist Level: Independent. Distance: To and from bathroom and HH distances           Activity Tolerance:  Patient tolerance of  treatment: good. Assessment:  Pt presents at or near baseline level of function. Pt verbalizes no apparent deficits at this time. Pt reports he feels like his FM and strength has returned to normal. Pt condones he feels his balance is normal.  No continued OT services at this time. Pt is independent with all ADLs. Please re-consult if needed for re-assessment.    Prognosis: Good  REQUIRES OT FOLLOW-UP: No  No Skilled OT: Independent with functional mobility, Independent with ADL's, Safe to return home, At baseline function  Decision Making: Low Complexity    Treatment Initiated: Treatment and education initiated within context of evaluation. Evaluation time included review of current medical information, gathering information related to past medical, social and functional history, completion of standardized testing, formal and informal observation of tasks, assessment of data and development of plan of care and goals. Treatment time included skilled education and facilitation of tasks to increase safety and independence with ADL's for improved functional independence and quality of life. Discharge Recommendations:  Home with assist PRN    Patient Education:     Patient Education  Education Given To: Patient  Education Provided: Role of Therapy, Plan of Care, Fall Prevention Strategies  Education Method: Demonstration, Verbal  Barriers to Learning: None  Education Outcome: Verbalized understanding, Demonstrated understanding    Equipment Recommendations:  Equipment Needed: No    Plan:  Times Per Week: 1 time  Times Per Day: Once a day  Current Treatment Recommendations: Balance training, Functional mobility training, Self-Care / ADL. See long-term goal time frame for expected duration of plan of care. If no long-term goals established, a short length of stay is anticipated. Goals:  Patient goals : To return home with sister  Short Term Goals  Time Frame for Short Term Goals: until discharge  Short Term Goal 1: Pt will be Modified independent with all ADLs. Short Term Goal 2: Pt will safely navigate to/from bathroom and [facility] distances with Modified [place]. Following session, patient left in safe position with all fall risk precautions in place.

## 2023-01-28 NOTE — PROGRESS NOTES
[facility] NEUROSCIENCES 4A  Speech - Language - Cognitive Evaluation + Clinical Swallow Evaluation    SLP Individual Minutes  Time In: 0391  Time Out: 4707  Minutes: 23  Timed Code Treatment Minutes: 0 Minutes     Speech, Language, Cognitive Evaluation: 15 minutes  Clinical Swallow Evaluation: 8 minutes    Date: 2023  Patient Name: Deborah Arreola      CSN: [id number]   : 1960  (58 y.o.)  Gender: male   Referring Physician:  Kelly Hankins PA-C  Diagnosis: Acute CVA  Precautions: Fall Risk  History of Present Illness/Injury: Patient admitted to [facility] with above diagnosis; please see physician H&P for full report. Per chart review, \"Patient is a 58 y.o. male with no past medical history who presents for evaluation of slurred speech. Patient states that he had right-sided numbness/weakness and facial droop today at 1000 which resolved after a couple hours and 650 mg home aspirin. He noticed a recurrence of symptoms at 2000 which included mumbled speech, right-sided facial droop, and general weakness but no discernable sided extremity deficit. He states that he has a productive cough for the past few weeks, but never had fevers, fatigue, or limited physical activity. Patient is otherwise healthy, active, no chronic medical history, takes no medications, and does not have a family history of clotting disorders. Patient does have a family history of CVA in his mother at age 80. \"    ST consulted to further evaluate oropharyngeal swallow integrity and cognitive function with implementation of goals/POC as clinically indicated. History reviewed. No pertinent past medical history. Pain: No pain reported. Subjective:  Patient seen with MINA Yusuf permission. Patient seen sitting upright in bed upon ST arrival; alert and cooperative throughout evaluation. Patient's significant  other present throughout.     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 Hobbies: Golf, Cards  Vision Status: Impaired; prescription glasses not worn  Hearing: Impaired; hearing aids not worn  Type of Home: House  Home Layout: Two level, Performs ADL's on one level  Home Access: Stairs to enter without rails  Entrance Stairs - Number of Steps: 2 SHAINA  Home Equipment: Walker, rolling, Melanie Keyana / VOICE:  Speech and Voice appear to be grossly intact for basic and complex daily communication    LANGUAGE:  Receptive:  Receptive language skills appear to be grossly intact for basic and complex daily communication. Expressive:  Expressive language skills appear to be grossly intact for basic and complex daily communication. COGNITION:  Selvin Cognitive Assessment (MOCA) version 7.2 completed. Patient scored 28/30. Normal is greater than or equal to 26/30. Inclusion of +1 point given highest level of education achieved less than/equal to 12th grade or GED with limited-0 post-secondary schooling   Orientation: 6/6 with self corrections  Immediate Recall: 4/5 independent, 1/5 with repetition  Short-Term Recall: 4/5 independent, 1/5 unable to elicit  Divergent Namin members/60 seconds  Problem Solving: [facility]  Reasonin/2 independent  Sequencin/1 independent  Thought Organization: [facility]  Insight: Good  Attention: 3/3 BASIC sustained attention  Math Computation: 3/5 independent  Executive Functionin/5 independent  Cognitive skills appear to be grossly intact.     SWALLOWING:    Respiratory Status: Room Air      Behavioral Observation: Alert and Oriented    CRANIAL NERVE ASSESSMENT   CN V (Trigeminal) Closes and Opens [facility]    Rotary Jaw Movement WFL      CN VII (Facial) Cheeks Hold Food out of [employer], Closes/Seals, Protrudes, Retracts Lips WFL    General Appearance WFL    Sensation WFL      CN X (Vagus - Pharyngeal) Raises Back of Tongue WFL      CN XI (Accessory) Lifts Soft Palate WFL      CN XII (Hypoglossal) Elevates Tongue Up and Back WFL    Protrusion   WFL    Lateralizes Tongue WFL    Sensation Not Tested      Other Observations Dentition Good  natural dentition    Vocal Quality WFL    Cough WFL     PATIENT WAS EVALUATED USING:  Thin Liquids and Coarse Solids    ORAL PHASE:  WFL    PHARYNGEAL PHASE:  WFL:  Pharyngeal phase appears WFL but cannot rule out pharyngeal phase deficits from a bedside swallowing evaluation alone. SIGNS AND SYMPTOMS OF LARYNGEAL PENETRATION / ASPIRATION:  No signs/symptoms of aspiration evident in this evaluation, but cannot rule out silent aspiration. INSTRUMENTAL EVALUATION: Instrumental evaluation not indicated at this time. DIET RECOMMENDATIONS:  Regular Diet with Thin Liquids    STRATEGIES: Full Upright Position        RECOMMENDATIONS/ASSESSMENT:  DIAGNOSTIC IMPRESSIONS:    [id number] Evaluation: Patient presents with cognitive skilled grossly intact as outlined by aforementioned evaluation results. Speech and voice appear to be [facility] with no presence of dysarthria, dysphonia, or aphonia; patient intelligible at the conversation level with approximately 100% accuracy. Expressive and receptive language skills grossly intact with no apparent communicative breakdowns. No ST services recommended at this time; certainly re-consult should MRI results demonstrate acute intracranial findings. Patient presents with oral phase of swallow function that is essentially [facility] with inability to fully discern potential presence of pharyngeal phase deficits without formal instrumentation. All labial/lingual structures intact and appear to be functioning appropriately at bedside.  Oral phase highly unremarkable during consumption of hard/textured solids with patient demonstrating adequate mastication pattern for textural breakdown, cohesive bolus formation, and manipulation. Thin liquids consumed without overt difficulty and with suspected control/containment of fluid bolus. NO overt s/s aspiration exhibited across all consistencies/trials consumed, certainly not able to exclude pharyngeal phase dysfunction and/or airway invasion events in its entirety at bedside alone. Patient's swallow physiology does appear appropriate to support PO intake without distress with instrumental evaluation not warranted. Recommend continuation of regular diet with thin liquids. No further ST services are warranted at this time r/t dysphagia management given baseline status of swallow function achieved; please re-consult should further needs be identified. Post evaluation, patient withOUT respiratory distress upon leaving room; RN Billie Reyes notified re: clinical findings and recommendations from the assessment; verbal receptiveness noted. Rehabilitation Potential: excellent  Discharge Recommendations:  No F/U St Services    EDUCATION:  Learner: Patient and Significant Other  Education:  Reviewed results and recommendations of this evaluation, Reviewed diet and strategies, Reviewed ST goals and Plan of Care, and Reviewed recommendations for follow-up  Evaluation of Education: Verbalizes understanding    PLAN:  No further speech therapy services indicated.       Miriam Hood M.S., [facility]

## 2023-01-28 NOTE — H&P
Hospitalist - History & Physical      Patient: Evelyn Monroy    Unit/Bed:4A-18/018-A  YOB: 1960  MRN: [id number]   Acct: [de-identified]   PCP: No primary care provider on file. Assessment and Plan:        Acute cerebrovascular accident:   CTA:   High-grade stenosis of one left M2 branch  Mild-grade stenosis of internal carotid arteries  High-grade stenosis of left vertebral artery  NIHSS: 0  Pending MRI brain without contrast  Consult neurology  NPO, consult SLP for swallow screen - AHA diet when resumed  Telemetry, NIHSS/Neuro checks, swallow screen before meals  Permissive HTN - 140-180mmHg  Consider lipid panel and start statin therapy as PO permits  ASA, Plavix, high intensity statin therapy  Respiratory tract infection:   CXR: Negative  CT head: paranasal sinus disease noted  Appears viral, uncomplicated  Monitor  Incentive spirometry  Hyperglycemia:   Uncontrolled, >200 at presentation  Indicative of new diabetes mellitus  HbA1C  POCT glucose q6hr  Hypoglycemic protocol  Tobacco use:   Encourage cessation  Nicotine 14mg patch daily  History of constipation:   Continue home metamucil    CC:  Slurred speech    HPI: Patient is a 58 y.o. male with no past medical history who presents for evaluation of slurred speech. Patient states that he had right-sided numbness/weakness and facial droop today at 1000 which resolved after a couple hours and 650 mg home aspirin. He noticed a recurrence of symptoms at 2000 which included mumbled speech, right-sided facial droop, and general weakness but no discernable sided extremity deficit. He states that he has a productive cough for the past few weeks, but never had fevers, fatigue, or limited physical activity. Patient is otherwise healthy, active, no chronic medical history, takes no medications, and does not have a family history of clotting disorders. Patient does have a family history of CVA in his mother at age 80.     Patient is being admitted for further evaluation of acute cerebrovascular accident. ROS: Review of Systems   Constitutional: Negative. HENT:  Positive for congestion, rhinorrhea and sinus pressure. Eyes: Negative. Respiratory:  Positive for cough. Cardiovascular: Negative. Gastrointestinal: Negative. Endocrine: Negative. Genitourinary: Negative. Musculoskeletal: Negative. Skin: Negative. Allergic/Immunologic: Negative. Neurological: Negative. Hematological: Negative. Psychiatric/Behavioral: Negative. PMH:  History reviewed. No pertinent past medical history. SHX:    Social History     Socioeconomic History    Marital status:      Spouse name: Not on file    Number of children: Not on file    Years of education: Not on file    Highest education level: Not on file   Occupational History    Not on file   Tobacco Use    Smoking status: Every Day     Packs/day: 0.50     Types: Cigarettes    Smokeless tobacco: Not on file   Substance and Sexual Activity    Alcohol use: Yes     Alcohol/week: 3.0 standard drinks     Types: 3 Cans of beer per week     Comment: daily    Drug use: Not on file    Sexual activity: Not on file   Other Topics Concern    Not on file   Social History Narrative    Not on file     Social Determinants of Health     Financial Resource Strain: Not on file   Food Insecurity: Not on file   Transportation Needs: Not on file   Physical Activity: Not on file   Stress: Not on file   Social Connections: Not on file   Intimate Partner Violence: Not on file   Housing Stability: Not on file     FHX: History reviewed. No pertinent family history.   Allergies: No Known Allergies  Medications:     sodium chloride        enoxaparin  40 mg SubCUTAneous Daily    atorvastatin  80 mg Oral Nightly    clopidogrel  75 mg Oral Daily     ondansetron, 4 mg, Q8H PRN   Or  ondansetron, 4 mg, Q6H PRN  polyethylene glycol, 17 g, Daily PRN  perflutren lipid microspheres, 1.5 mL, ONCE PRN  acetaminophen, 650 mg, Q4H PRN   Or  acetaminophen, 650 mg, Q4H PRN  No current facility-administered medications on file prior to encounter.      Current Outpatient Medications on File Prior to Encounter   Medication Sig Dispense Refill    tiZANidine (ZANAFLEX) 2 MG tablet Take 1 tablet by mouth 3 times daily as needed (back pain) 30 tablet 0         Labs:   Recent Results (from the past 24 hour(s))   POCT Glucose    Collection Time: 01/27/23  9:21 PM   Result Value Ref Range    POC Glucose 206 (H) 70 - 108 mg/dl   EKG 12 Lead    Collection Time: 01/27/23  9:31 PM   Result Value Ref Range    Ventricular Rate 83 BPM    Atrial Rate 83 BPM    P-R Interval 144 ms    QRS Duration 86 ms    Q-T Interval 390 ms    QTc Calculation (Bazett) 458 ms    P Axis 12 degrees    R Axis 18 degrees    T Axis 65 degrees   CBC with Auto Differential    Collection Time: 01/27/23  9:40 PM   Result Value Ref Range    WBC 7.4 4.8 - 10.8 thou/mm3    RBC 4.80 4.70 - 6.10 mill/mm3    Hemoglobin 14.7 14.0 - 18.0 gm/dl    Hematocrit 43.9 42.0 - 52.0 %    MCV 91.5 80.0 - 94.0 fL    MCH 30.6 26.0 - 33.0 pg    MCHC 33.5 32.2 - 35.5 gm/dl    RDW-CV 13.1 11.5 - 14.5 %    RDW-SD 43.9 35.0 - 45.0 fL    Platelets 665 622 - 424 thou/mm3    MPV 11.0 9.4 - 12.4 fL    Seg Neutrophils 46.4 %    Lymphocytes 36.1 %    Monocytes 10.2 %    Eosinophils 6.4 %    Basophils 0.8 %    Immature Granulocytes 0.1 %    Segs Absolute 3.4 1.8 - 7.7 thou/mm3    Lymphocytes Absolute 2.7 1.0 - 4.8 thou/mm3    Monocytes Absolute 0.8 0.4 - 1.3 thou/mm3    Eosinophils Absolute 0.5 (H) 0.0 - 0.4 thou/mm3    Basophils Absolute 0.1 0.0 - 0.1 thou/mm3    Immature Grans (Abs) 0.01 0.00 - 0.07 thou/mm3    nRBC 0 /100 wbc   Comprehensive Metabolic Panel w/ Reflex to MG    Collection Time: 01/27/23  9:40 PM   Result Value Ref Range    Glucose 163 (H) 70 - 108 mg/dL    Creatinine 0.5 0.4 - 1.2 mg/dL    BUN 12 7 - 22 mg/dL    Sodium 141 135 - 145 meq/L    Potassium reflex Magnesium 3.8 3.5 - 5.2 meq/L    Chloride 104 98 - 111 meq/L    CO2 24 23 - 33 meq/L    Calcium 9.4 8.5 - 10.5 mg/dL    AST 27 5 - 40 U/L    Alkaline Phosphatase 67 38 - 126 U/L    Total Protein 6.7 6.1 - 8.0 g/dL    Albumin 4.4 3.5 - 5.1 g/dL    Total Bilirubin 0.5 0.3 - 1.2 mg/dL    ALT 18 11 - 66 U/L   Troponin    Collection Time: 01/27/23  9:40 PM   Result Value Ref Range    Troponin T < 0.010 ng/ml   Protime-INR    Collection Time: 01/27/23  9:40 PM   Result Value Ref Range    INR 0.86 0.85 - 1.13   Anion Gap    Collection Time: 01/27/23  9:40 PM   Result Value Ref Range    Anion Gap 13.0 8.0 - 16.0 meq/L   Glomerular Filtration Rate, Estimated    Collection Time: 01/27/23  9:40 PM   Result Value Ref Range    Est, Glom Filt Rate >60 >60 ml/min/1.73m2   Osmolality    Collection Time: 01/27/23  9:40 PM   Result Value Ref Range    Osmolality Calc 284.6 275.0 - 300.0 mOsmol/kg   POCT Creatinine    Collection Time: 01/27/23  9:46 PM   Result Value Ref Range    POC CREATININE WHOLE BLOOD 0.6 0.5 - 1.2 mg/dl         Vital Signs: T: 97.5 F P: 70 RR: 20 B/P: 138/86: FiO2: RA: O2 Sat:98: I/O: No intake or output data in the 24 hours ending 01/28/23 0000      General:   Patient in no acute distress resting comfortably in bed. No obvious facial droop. Congenital cleft lip noted. HEENT:  normocephalic and atraumatic. No scleral icterus. PEARLA, mucous membranes moist  Neck: supple. Trachea midline. No JVD. Full ROM, no meningismus. Lungs: clear to auscultation except right upper lobe with mild rhonchi. No retractions, no accessory muscle use. Cardiac: RRR, no murmur, 2+ pulses  Abdomen: soft. Nontender. Bowel sounds present  Extremities:  No clubbing, cyanosis x 4, no edema. No weakness bilaterally   Vasculature: capillary refill < 3 seconds. Skin:  warm and dry. no visible rashes  Psych:  Alert and oriented x3. Affect appropriate  Lymph:  No supraclavicular adenopathy. Neurologic:  CN II-XII grossly intact. No focal deficit. NIH 0    Data: (All radiographs, tracings, PFTs, and imaging are personally viewed and interpreted unless otherwise noted).      EKG: rhythm: normal sinus rhythm and premature ventricular contractions, rate=83 bpm, vq=582 ms, qrs=86 ms, ge=730 ms, axis=12 degrees      Electronically signed by  Aurea Platt PA-C

## 2023-01-28 NOTE — CONSULTS
Neurology Consult Note    Date:1/28/2023       ELENA:[id number]  Patient Vickie Morales     YOB: 1960     Age:62 y.o. Requesting Physician: Jonathan Green MD     Reason for Consult:  Evaluate for CVA, stenosis left M2 branch      Chief Complaint:   Chief Complaint   Patient presents with    Facial Droop       Subjective     Maritza Cintron is a 58 y.o. male with no significant past medical history who presents to [facility] on 1/27/2023 for concerns of slurred speech, word finding difficulties, decreased  strength in right hand, and objective right facial droop. 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 He went to [facility] and bought some aspirin and took two 325mg tablets. Throughout the afternoon he felt okay for a couple hours and then felt it coming back on and so he said he took two more aspirin. When he got home he dozed off and upon wakening at 8 PM reports he was slurring his speech significantly and decided to come to the emergency department. CT head negative for any acute intracranial abnormalities. CT angiogram head and neck reveals multifocal intracranial stenosis. He was loaded with 300 mg of Plavix, 324 mg of aspirin in the emergency department. His NIH while in the emergency department was a 2 for dysarthria, facial droop. He denies any history of stroke or atrial fibrillation. He does report smoking approximately half a pack per day and drinking 2 double vodkas nightly. He denies any daily anticoagulation or antiplatelet use. He does report that his mother had a stroke at the age of 80.   He denies any lightheadedness, dizziness, vision changes, weakness, or numbness. Both the patient and his wife feel that he had resolved later in the evening last night and feel his speech is back to normal.  He does mention that he does not see a primary care provider frequently. Review of Systems   Review of Systems   Constitutional:  Negative for chills and fever. HENT:  Negative for rhinorrhea and sore throat. Eyes:  Negative for photophobia and visual disturbance. Respiratory:  Negative for cough and shortness of breath. Cardiovascular:  Negative for chest pain and palpitations. Gastrointestinal:  Negative for abdominal pain, nausea and vomiting. Genitourinary:  Negative for dysuria. Musculoskeletal:  Negative for arthralgias and myalgias. Skin:  Negative for rash. Neurological:  Positive for facial asymmetry (resolved), speech difficulty, weakness (right hand , resolved) and headaches. Negative for dizziness, syncope, light-headedness and numbness. Psychiatric/Behavioral:  Negative for confusion. The patient is not nervous/anxious. Medications   Scheduled Meds:    insulin lispro  0-4 Units SubCUTAneous TID WC    insulin lispro  0-4 Units SubCUTAneous Nightly    nicotine  1 patch TransDERmal Daily    enoxaparin  40 mg SubCUTAneous Daily    atorvastatin  80 mg Oral Nightly    clopidogrel  75 mg Oral Daily     Continuous Infusions:    dextrose      sodium chloride 75 mL/hr at 01/28/23 0150     PRN Meds: glucose, dextrose bolus **OR** dextrose bolus, glucagon (rDNA), dextrose, ondansetron **OR** ondansetron, polyethylene glycol, perflutren lipid microspheres, acetaminophen **OR** acetaminophen  Medications Prior to Admission:   No current facility-administered medications on file prior to encounter.      Current Outpatient Medications on File Prior to Encounter   Medication Sig Dispense Refill    tiZANidine (ZANAFLEX) 2 MG tablet Take 1 tablet by mouth 3 times daily as needed (back pain) (Patient not taking: Reported on 2023) 30 tablet 0     Past History    Past Medical History:   has no past medical history on file. Social History:   reports that he has been smoking cigarettes. He has been smoking an average of .5 packs per day. He does not have any smokeless tobacco history on file. He reports current alcohol use of about 3.0 standard drinks per week. Family History: History reviewed. No pertinent family history. Physical Examination      Vitals:  BP (!) 137/94   Pulse 76   Temp 98.1 °F (36.7 °C) (Oral)   Resp 16   Ht 5' 7\" (1.702 m)   Wt 165 lb 11.2 oz (75.2 kg)   SpO2 98%   BMI 25.95 kg/m²   Temp (24hrs), Av.8 °F (36.6 °C), Min:97.5 °F (36.4 °C), Max:98.1 °F (36.7 °C)      I/O (24Hr): No intake or output data in the 24 hours ending 23 7223      Physical Exam  Vitals reviewed. Constitutional:       General: He is not in acute distress. Appearance: Normal appearance. He is not ill-appearing. HENT:      Head: Normocephalic and atraumatic. Right Ear: External ear normal.      Left Ear: External ear normal.      Nose: Nose normal.      Mouth/Throat:      Mouth: Mucous membranes are moist.      Pharynx: No oropharyngeal exudate or posterior oropharyngeal erythema. Eyes:      Extraocular Movements: EOM normal.      Pupils: Pupils are equal, round, and reactive to light. Cardiovascular:      Rate and Rhythm: Normal rate and regular rhythm. Heart sounds: Normal heart sounds. No murmur heard. Pulmonary:      Effort: Pulmonary effort is normal. No respiratory distress. Breath sounds: Normal breath sounds. No wheezing. Abdominal:      General: Bowel sounds are normal.      Palpations: Abdomen is soft. Tenderness: There is no abdominal tenderness. Musculoskeletal:         General: Normal range of motion. Cervical back: Normal range of motion. Right lower leg: No edema. Left lower leg: No edema.    Skin:     General: Skin is warm.      Findings: No rash.   Neurological:      Mental Status: He is alert and oriented to person, place, and time.      Coordination: Finger-Nose-Finger Test and Heel to Shin Test normal.   Psychiatric:         Mood and Affect: Mood normal.         Speech: Speech normal.         Behavior: Behavior normal.     Neurologic Exam     Mental Status   Oriented to person, place, and time.   Registration: recalls 3 of 3 objects. Follows 2 step commands.   Attention: normal. Concentration: normal.   Speech: speech is normal   Level of consciousness: alert  Knowledge: good.   Able to name object. Able to read. Able to repeat. Normal comprehension.     Cranial Nerves     CN II   Visual fields full to confrontation.     CN III, IV, VI   Pupils are equal, round, and reactive to light.  Extraocular motions are normal.   Right pupil: Size: 3 mm. Shape: regular. Reactivity: brisk.   Left pupil: Size: 3 mm. Shape: regular. Reactivity: brisk.     CN V   Facial sensation intact.     CN VII   Facial expression full, symmetric.     CN VIII   CN VIII normal.     CN IX, X   CN IX normal.   CN X normal.   Palate: symmetric    CN XI   CN XI normal.   Right trapezius strength: normal  Left trapezius strength: normal    CN XII   CN XII normal.   Tongue deviation: none    Motor Exam   Muscle bulk: normal  Overall muscle tone: normal  Right arm pronator drift: absent  Left arm pronator drift: absent    BUE: 5/5  RLE: 4+/5.  He reports weakness secondary to his hip  LLE: 5/5     Sensory Exam   Light touch normal.     Gait, Coordination, and Reflexes     Coordination   Finger to nose coordination: normal  Heel to shin coordination: normal    Tremor   Resting tremor: absent  Intention tremor: absent  Action tremor: absent     Labs/Imaging/Diagnostics   Labs:  CBC:  Recent Labs     01/27/23  2140 01/28/23  0356   WBC 7.4 5.3   RBC 4.80 4.50*   HGB 14.7 13.7*   HCT 43.9 42.9   MCV 91.5 95.3*    140     CHEMISTRIES:  Recent Labs      01/27/23  2140      K 3.8      CO2 24   BUN 12   CREATININE 0.5   GLUCOSE 163*     COAGULATION STUDIES:  Recent Labs     01/27/23  2140   INR 0.86     LIVER PROFILE:  Recent Labs     01/27/23 2140   AST 27   ALT 18   BILITOT 0.5   ALKPHOS 67     CHOLESTEROL AND A1C:  Recent Labs     01/28/23  0356   LDLCALC 75   HDL 97   CHOL 185   TRIG 65      Imaging Last 24 Hours:  CTA HEAD W WO CONTRAST (CODE STROKE)    Result Date: 1/27/2023  **ADDENDUM #1 **This report was discussed with Dr Rosi Vega on Jan 27, 2023 22:28:00 EST. This document has been electronically signed by: Stacie Granger on 01/27/2023 10:28 PM ** ORIGINAL REPORT **CTA HEAD WITH CONTRAST COMPARISON: Noncontrast CT brain 1/27/2023. FINDINGS: CTA neck will be reported separately. Short segment of high-grade stenosis is noted within 1 of the left M2 branches on axial image 475 of series 506. The right middle cerebral artery is patent. The anterior cerebral arteries are patent. Calcific plaque is noted within the carotid siphons. Basilar artery is patent. Proximal portions of the posterior cerebral arteries have a beaded appearance, raising the question of vasculitis. Short segment of mild to moderate grade narrowing is noted in the proximal right P1 segment. 1. There is a short segment of high-grade stenosis within 1 of the left M2 branches. 2. The proximal portions of the posterior cerebral arteries have a beaded appearance. This raises the question of vasculitis. 3. A short segment of mild to moderate grade narrowing is noted in the proximal right P1 segment. 4. Consultation with a neurointensivist is advised. This document has been electronically signed by: Renny Rinne, M.D. on 01/27/2023 10:25 PM All CTs at this facility use dose modulation techniques and iterative reconstructions, and/or weight-based dosing when appropriate to reduce radiation to a low as reasonably achievable. 3D Post-processing was performed on this study.     CT HEAD WO CONTRAST    Result Date: 1/27/2023  **ADDENDUM #1 **This report was discussed with Joseph Johnson RN on Jan 27, 2023 21:47:00 EST. This document has been electronically signed by: Modesta Vick on 01/27/2023 09:48 PM ** ORIGINAL REPORT ** CT BRAIN WITHOUT CONTRAST COMPARISON: None. FINDINGS: There is mild parenchymal atrophy. There is no evidence of an acute infarct or intraparenchymal hemorrhage. Patchy areas of low attenuation are noted in the subcortical and periventricular regions of the supratentorial brain which are nonspecific but most likely represent chronic small vessel ischemic disease. There is no mass effect, midline shift, or extra-axial blood. The ventricles are normal in size without evidence of hydrocephalus. The bone windows are unremarkable. Air-fluid level is noted within the left sphenoid sinus, along with mucosal thickening. There is mucosal thickening and partial opacification of the visualized portions of the right maxillary sinus. There is mild mucosal thickening in the ethmoid air cells. 1. No acute disease in the brain. 2. Paranasal sinus disease is noted. This document has been electronically signed by: Ashlie Trimble M.D. on 01/27/2023 09:46 PM All CTs at this facility use dose modulation techniques and iterative reconstructions, and/or weight-based dosing when appropriate to reduce radiation to a low as reasonably achievable. CTA NECK W WO CONTRAST (CODE STROKE)    Result Date: 1/27/2023  CTA NECK WITH CONTRAST COMPARISON: None. FINDINGS: CTA head will be reported separately. There is no occlusion, dissection, aneurysm, or vascular malformation. There is no active bleeding. Right vertebral artery is patent. Calcific plaque causes high-grade stenosis of the proximal left vertebral artery. Calcific plaque is also noted more distally in the left vertebral artery. The right common carotid artery is patent. There is mild soft and calcific plaque in the right carotid bulb.  The right external carotid artery is patent. Calcific plaque causes mild grade stenosis of the proximal right internal carotid artery. On the left side, the common carotid artery is patent. Calcific plaque causes mild grade stenosis of the proximal left internal carotid artery. Left external carotid artery is patent. Reversal of the normal cervical lordosis is noted. There is mild anterolisthesis of C3 on C4, C4 on C5, C5 on C6, and C6 on C7. There are multilevel endplate degenerative changes and facet arthrosis. 1. Mild grade stenosis of the proximal internal carotid arteries. 2. Calcific plaque causes high-grade stenosis of the proximal left vertebral artery. 3. Additional findings are detailed above. This document has been electronically signed by: Bonilla Russell M.D. on 01/27/2023 10:12 PM All CTs at this facility use dose modulation techniques and iterative reconstructions, and/or weight-based dosing when appropriate to reduce radiation to a low as reasonably achievable. Carotid stenosis and measurements are in accordance with NASCET criteria. 3D Post-processing was performed on this study. XR CHEST PORTABLE    Result Date: 1/27/2023  CHEST X-RAY FRONTAL VIEW COMPARISON: 10/15/2022. FINDINGS: A single frontal view of the chest was performed. The cardiac size and mediastinal silhouette are within normal limits. The lungs are clear. There are no acute infiltrates or pleural effusions. There is no pneumothorax. 1. No acute disease. This document has been electronically signed by: Bonilla Russell M.D. on 01/27/2023 10:03 PM    MRI brain without contrast    Result Date: 1/28/2023  PROCEDURE: MRI BRAIN WO CONTRAST CLINICAL INFORMATION CVA. Right-sided numbness and weakness. Facial droop. COMPARISON: Head CT 1/27/2023. TECHNIQUE: Multiplanar and multiple spin echo MRI images were obtained of the brain without contrast. FINDINGS: The diffusion-weighted images are normal.  The brain volume is mildly reduced.  There is minimal signal hyperintensity scattered in the white matter of the brain suggestive of minimal severity chronic small vessel ischemic changes. There are no intra-or extra-axial collections. There is no hydrocephalus, midline shift or mass effect. There is no susceptibility artifact in the brain. The major intracranial vascular flow voids are present. The midline craniocervical junction structures are normal.  The pituitary gland and brainstem are normal. There is moderate severity mucosal thickening in the right maxillary sinus and the sphenoid sinus. There is some fluid signal in the right mastoid air cells. 1. No evidence of an acute infarct. 2. Mucosal thickening in the paranasal sinuses. **This report has been created using voice recognition software. It may contain minor errors which are inherent in voice recognition technology. ** Final report electronically signed by Dr. Jerald Franks on 1/28/2023 12:14 PM         Assessment and Plan:        TIA  Imaging  CT revealed no acute findings  CTA of head and neck revealed multifocal intracranial stenosis, full read above. No need for carotid ultrasound. MRI of brain without contrast negative for any acute intracranial abnormalities  2D echocardiogram ordered. Stat CT head is needed if the patient develops new-onset altered mental status, a severe headache, or new-onset neurologic deficit  Risk factors and medications  Blood pressure goal: less than 130/80. Keep well hydrated. Initiate normal saline at 75 ml/hr as needed. Antithrombotics: aspirin 325 mg daily, Plavix 75 mg daily. Can stop the Plavix in 3 months  HgbA1C 5.7  LDL 75. LDL goal of 45-70. Continue Lipitor 80 mg daily  Smoking and alcohol cessation. Provide stroke eduction for individualized risk factors. EKG/telemetry to monitor for atrial fibrillation  Core stroke metrics  Dysphagia screen prior to oral intake  PT/OT/SLP consult. IPR consult if applicable.   DVT prophylaxis: Lovenox, SCDs  NIHSS every shift. Neuro checks per unit unless otherwise specified. Pre-morbid Modified Scooter Scale: 0 - No symptoms at all. 30-day cardiac event monitor upon discharge  Patient to follow-up in the neuro interventional clinic in 3 months with repeat CT angiogram of head for multifocal intracranial stenosis. Our office will schedule this. This patient was seen and evaluated with Dr. Wes Burroughs and he is in agreement with the assessment and plan. Electronically signed by EZEQUIEL Lauren CNP on 1/28/23 at 1:02 PM EST    I have independently reviewed the hospital record and examined this patient on 1/28/23. I have discussed my findings with EZEQUIEL Lauren CNP. I have personally seen and examined this patient and the treatment plan was developed by me and outlined in this note by EZEQUIEL Lauren CNP. The timing of the note filing does not necessarily correlate with the timing of when the patient was seen by me.       Yadira Danielle MD, [id number]  Vascular & Interventional Neurology and NeuroCritical Care

## 2023-01-28 NOTE — ED PROVIDER NOTES
[address] [facility] [address] [address] EMERGENCY DEPT      EMERGENCY MEDICINE     Pt Name: Tanja Lopez  MRN: [id number]  [place] 1960  Date of evaluation: 1/27/2023  Provider: Emma Ellison DO  Supervising Physician: Nirmal Victor DO    CHIEF COMPLAINT       Chief Complaint   Patient presents with    Facial Droop     HISTORY OF PRESENT ILLNESS   Tanja Lopez is a pleasant 58 y.o. male who presents to the emergency department from home via EMS for evaluation of facial droop. Patient reportedly started having facial droop, slurred speech, decreased  strength in the right hand at 10 AM this morning. That afternoon he took some aspirin and his symptoms somewhat went away but never completely went away. At 8 PM tonight he noticed that the facial droop and slurred speech came back so he decided come in. Patient is not on any blood thinners. Patient denies any history of prior stroke. He does smoke every day and drinks about 4 drinks every day. Tonight he had a double vodka with green tea. PASTMEDICAL HISTORY   History reviewed. No pertinent past medical history. Patient Active Problem List   Diagnosis Code    Acute CVA (cerebrovascular accident) ([facility] [address] I63.9       SURGICAL HISTORY     History reviewed. No pertinent surgical history. CURRENT MEDICATIONS       Previous Medications    TIZANIDINE (ZANAFLEX) 2 MG TABLET    Take 1 tablet by mouth 3 times daily as needed (back pain)       ALLERGIES     has No Known Allergies. FAMILY HISTORY     has no family status information on file. SOCIAL HISTORY       Social History     Tobacco Use    Smoking status: Every Day     Packs/day: 0.50     Types: Cigarettes   Substance Use Topics    Alcohol use:  Yes     Alcohol/week: 3.0 standard drinks     Types: 3 Cans of beer per week     Comment: daily       PHYSICAL EXAM       ED Triage Vitals   BP Temp Temp Source Heart Rate Resp SpO2 Height Weight   01/27/23 2125 01/27/23 2125 01/27/23 2125 01/27/23 2125 01/27/23 2125 01/27/23 2125 01/27/23 2125 01/27/23 2125   (!) 155/103 97.5 °F (36.4 °C) Oral 87 17 98 % 5' 7\" (1.702 m) 165 lb 11.2 oz (75.2 kg)       Physical Exam  Vitals and nursing note reviewed. Constitutional:       General: He is not in acute distress. Appearance: He is not ill-appearing or toxic-appearing. HENT:      Head: Normocephalic and atraumatic. Right Ear: External ear normal.      Left Ear: External ear normal.      Nose: Nose normal. No congestion. Mouth/Throat:      Mouth: Mucous membranes are moist.      Pharynx: Oropharynx is clear. Eyes:      Extraocular Movements: Extraocular movements intact. Conjunctiva/sclera: Conjunctivae normal.      Pupils: Pupils are equal, round, and reactive to light. Cardiovascular:      Rate and Rhythm: Normal rate and regular rhythm. Pulses: Normal pulses. Heart sounds: Normal heart sounds. Pulmonary:      Effort: Pulmonary effort is normal. No respiratory distress. Breath sounds: Normal breath sounds. No wheezing. Abdominal:      General: There is no distension. Palpations: Abdomen is soft. Tenderness: There is no abdominal tenderness. There is no guarding. Musculoskeletal:         General: Normal range of motion. Cervical back: Normal range of motion and neck supple. No tenderness. Lymphadenopathy:      Cervical: No cervical adenopathy. Skin:     General: Skin is warm and dry. Capillary Refill: Capillary refill takes less than 2 seconds. Neurological:      Mental Status: He is alert and oriented to person, place, and time. Comments: Mild dysarthria. Mild right lower facial droop   Psychiatric:         Mood and Affect: Mood normal.       FORMAL DIAGNOSTIC RESULTS     RADIOLOGY: Interpretation per the Radiologist below, if available at the time of this note (none if blank):    XR CHEST PORTABLE   Final Result   1. No acute disease.       This document has been electronically signed by: Elmer Rae M.D. on    01/27/2023 10:03 PM      CTA HEAD W WO CONTRAST (CODE STROKE)   Final Result   1. There is a short segment of high-grade stenosis within 1 of the left M2    branches. 2. The proximal portions of the posterior cerebral arteries have a beaded    appearance. This raises the question of vasculitis. 3. A short segment of mild to moderate grade narrowing is noted in the    proximal right P1 segment. 4. Consultation with a neurointensivist is advised. This document has been electronically signed by: Elmer Rae M.D. on    01/27/2023 10:25 PM      All CTs at this facility use dose modulation techniques and iterative    reconstructions, and/or weight-based dosing   when appropriate to reduce radiation to a low as reasonably achievable. 3D Post-processing was performed on this study. CTA NECK W WO CONTRAST (CODE STROKE)   Final Result   1. Mild grade stenosis of the proximal internal carotid arteries. 2. Calcific plaque causes high-grade stenosis of the proximal left    vertebral artery. 3. Additional findings are detailed above. This document has been electronically signed by: Elmer Rae M.D. on    01/27/2023 10:12 PM      All CTs at this facility use dose modulation techniques and iterative    reconstructions, and/or weight-based dosing   when appropriate to reduce radiation to a low as reasonably achievable. Carotid stenosis and measurements are in accordance with NASCET criteria. 3D Post-processing was performed on this study. CT HEAD WO CONTRAST   Final Result   1. No acute disease in the brain. 2. Paranasal sinus disease is noted. This document has been electronically signed by: Elmer Rae M.D. on    01/27/2023 09:46 PM      All CTs at this facility use dose modulation techniques and iterative    reconstructions, and/or weight-based dosing   when appropriate to reduce radiation to a low as reasonably achievable.           LABS: (none if blank)  Labs Reviewed   CBC WITH AUTO DIFFERENTIAL - Abnormal; Notable for the following components:       Result Value    Eosinophils Absolute 0.5 (*)     All other components within normal limits   COMPREHENSIVE METABOLIC PANEL W/ REFLEX TO MG FOR LOW K - Abnormal; Notable for the following components:    Glucose 163 (*)     All other components within normal limits   POCT GLUCOSE - Abnormal; Notable for the following components:    POC Glucose 206 (*)     All other components within normal limits   TROPONIN   PROTIME-INR   ANION GAP   GLOMERULAR FILTRATION RATE, ESTIMATED   OSMOLALITY   POCT CREATININE       (Any cultures that may have been sent were not resulted at the time of this patient visit)    [address] / ED COURSE:     1) Number and Complexity of Problems            Problem List This Visit:         Chief Complaint   Patient presents with    Facial Droop            Differential Diagnosis includes (but not limited to):  Ischemic stroke, hemorrhagic stroke, TIA, carotid stenosis        Diagnoses Considered but I have low suspicion of:   Bell's Palsy, SDH, EDH, ACS, hypoglycemia, Elmer's paralysis             Pertinent Comorbid Conditions:    N/A    2)  Data Reviewed (none if left blank)          My Independent interpretations:     EKG:      Normal sinus rhythm with PVCs present. Normal rate, normal rhythm, normal axis, no ST elevation, normal QRS, normal QTC    Imaging: High-grade stenosis of one of the M2 branches. No hemorrhage noted. High-grade stenosis noted in the left proximal vertebral artery. Beaded appearance in the posterior cerebral arteries, concerning for possible vasculitis, per radiologist.    Labs:      Glucose is not low. Other lab work is unremarkable. I have reviewed all the lab work. Decision Rules/Clinical Scores utilized:  NIH=2  NIH Stroke Scale  Interval: Reassessment  Level of Consciousness (1a): Alert  LOC Questions (1b):  Answers both correctly  LOC Commands (1c): Performs both tasks correctly  Best Gaze (2): Normal  Visual (3): No visual loss  Facial Palsy (4): (!) Minor paralysis  Motor Arm, Left (5a): No drift  Motor Arm, Right (5b): No drift  Motor Leg, Left (6a): No drift  Motor Leg, Right (6b): No drift  Limb Ataxia (7): Absent  Sensory (8): Normal  Best Language (9): No aphasia  Dysarthria (10): Mild to moderate, slurs some words  Extinction and Inattention (11): No abnormality  Total: 2             External Documentation Reviewed:         Previous patient encounter documents & history available on EMR was reviewed ED note on 10/15/2022 reviewed, patient has no other visits in our system. See Formal Diagnostic Results above for the lab and radiology tests and orders. 3)  Treatment and Disposition         ED Reassessment: Patient still mildly dysarthric with a mild facial droop. Case discussed with consulting clinician: Yes, see ED course         Shared Decision-Making was performed and disposition discussed with the        Patient/Family and questions answered yes         Social determinants of health impacting treatment or disposition: Drinks daily         Code Status: Unknown      Summary of Patient Presentation:      MDM  Number of Diagnoses or Management Options  Cerebrovascular accident (CVA) due to stenosis of left middle cerebral artery [facility]): new, needed workup  Diagnosis management comments: Pt is a 59 y/o male who presents for evaluation of slurred speech, facial droop, and reported decreased  strength. His symptoms started at 10:00 AM but did not completely go away, so we are calling LKN at 10:00 AM. He arrived GCS 15, A&O x3. His most recent BP is 174/99 but vitals otherwise stable. NIH 2. Dr Geraldina Osler has reviewed the imaging and does not think the Pt is a candidate for any surgical intervention.  High grade stenosis noted in the M2 branch on the L side, I think this most likely caused his stroke symptoms. Amount and/or Complexity of Data Reviewed  Clinical lab tests: ordered and reviewed  Tests in the radiology section of CPT®: reviewed  Tests in the medicine section of CPT®: ordered and reviewed  Decide to obtain previous medical records or to obtain history from someone other than the patient: no  Obtain history from someone other than the patient: no  Discuss the patient with other providers: yes  Independent visualization of images, tracings, or specimens: yes    Risk of Complications, Morbidity, and/or Mortality  Presenting problems: high    Critical Care  Total time providing critical care: 30-74 minutes    Patient Progress  Patient progress: stable  /  ED Course as of 01/27/23 2252 Fri Jan 27, 2023 2146 I spoke with the neurointerventionalist, he agrees the Pt is not a TNK candidate. He requested that the Pt be started on 300 of Plavix, 324 of ASA, and IVF [AC]      ED Course User Index  [AC] Darrel Gastelum DO     Vitals Reviewed:    Vitals:    01/27/23 2125 01/27/23 2125 01/27/23 2125 01/27/23 2223   BP: (!) 155/103 (!) 155/103  (!) 174/99   Pulse: 87 87  83   Resp: 17 17 19   Temp: 97.5 °F (36.4 °C)      TempSrc: Oral      SpO2: 98% 98%  97%   Weight:  165 lb 11.2 oz (75.2 kg) 165 lb 11.2 oz (75.2 kg)    Height:  5' 7\" (1.702 m)         The patient was seen and examined. Appropriate diagnostic testing was performed and results reviewed with the patient. The results of pertinent diagnostic studies and exam findings were discussed. The patients provisional diagnosis and plan of care were discussed with the patient and present family who expressed understanding. Any medications were reviewed and indications and risks of medications were discussed with the patient /family present. Strict verbal and written return precautions, instructions and appropriate follow-up provided to  the patient.      ED Medications administered this visit:  (None if blank)  Medications   0.9 % sodium chloride bolus (1,000 mLs IntraVENous New Bag 1/27/23 2130)   iopamidol (ISOVUE-370) 76 % injection 80 mL (80 mLs IntraVENous Given 1/27/23 2134)   clopidogrel (PLAVIX) tablet 300 mg (300 mg Oral Given 1/27/23 2148)   aspirin chewable tablet 324 mg (324 mg Oral Given 1/27/23 2148)         PROCEDURES: (None if blank)  Procedures:     CRITICAL CARE: (None if blank)      DISCHARGE PRESCRIPTIONS: (None if blank)  New Prescriptions    No medications on file       FINAL IMPRESSION      1. Cerebrovascular accident (CVA) due to stenosis of left middle cerebral artery ([facility] [address]          DISPOSITION/PLAN   DISPOSITION Admitted 01/27/2023 10:51:36 PM      OUTPATIENT FOLLOW UP THE PATIENT:  No follow-up provider specified. Kasia Mercer DO    This transcription was electronically signed. Parts of this transcriptions may have been dictated by use of voice recognition software and electronically transcribed, and parts may have been transcribed with the assistance of an ED scribe. The transcription may contain errors not detected in proofreading. Please refer to my supervising physician's documentation if my documentation differs.     Electronically Signed: Kasia Mercer DO, 01/27/23, 10:52 PM      Kasia Mercer DO  Resident  01/27/23 [id number]

## 2023-01-28 NOTE — DISCHARGE SUMMARY
Hospital Medicine Discharge Summary      Patient Identification:   Lloyd Woody   : 1960  MRN: [id number]   Account: [de-identified]      Patient's PCP: No primary care provider on file. Admit Date: 2023     Discharge Date:   23    Admitting Physician: Grant Bach MD     Discharge Physician: Navin Yepez MD     Discharge Diagnoses: The patient was seen and examined on day of discharge and this discharge summary is in conjunction with any daily progress note from day of discharge. TIA 2/2 ICA stenosis:   CTA:   High-grade stenosis of one left M2 branch  Mild-grade stenosis of internal carotid arteries  High-grade stenosis of left vertebral artery  Posterior cerebral arteries have a beaded appearance, suggestive of possible vasculitis, ordered more labs. NIHSS: 0  MRI brain without contrast- negative. Neurology recommended trial of DAPT on discharge and to follow up with them in 3 months of symptoms return. NPO, consult SLP for swallow screen - AHA diet when resumed  Telemetry, NIHSS/Neuro checks, swallow screen before meals  Permissive HTN - 140-180mmHg  ASA, Plavix, high intensity statin therapy, SMOKING CESSATION  URI:   CXR: Negative  CT head: paranasal sinus disease noted  Appears viral, uncomplicated  Monitor  Incentive spirometry  Obtain Covid/flu swab  Hyperglycemia:   Uncontrolled, >200 at presentation  Indicative of new diabetes mellitus  HbA1C  POCT glucose q6hr  Hypoglycemic protocol  Tobacco use:   Encourage cessation  History of constipation:   Continue home metamucil       Hospital Course:   Lloyd Woody is a 58 y.o. male admitted to [address] [place] [address] on 2023 for trouble speaking. \"Patient is a 58 y.o. male with no past medical history who presents for evaluation of slurred speech.  Patient states that he had right-sided numbness/weakness and facial droop today at 1000 which resolved after a couple hours and 650 mg home aspirin. He noticed a recurrence of symptoms at 2000 which included mumbled speech, right-sided facial droop, and general weakness but no discernable sided extremity deficit. He states that he has a productive cough for the past few weeks, but never had fevers, fatigue, or limited physical activity. Patient is otherwise healthy, active, no chronic medical history, takes no medications, and does not have a family history of clotting disorders. Patient does have a family history of CVA in his mother at age 80. Patient is being admitted for further evaluation of acute cerebrovascular accident. \"    MRI negative. CTA showing branches of stenosis. Neuro recommending ongoing DAPT therapy. Secondary prevention therapy. Discharged. Needs to stop smoking. Exam:     Vitals:  Vitals:    01/27/23 2343 01/28/23 0323 01/28/23 0824 01/28/23 1057   BP: (!) 150/82 (!) 137/94 139/88 128/86   Pulse: 65 76 70 75   Resp: 18 16 18 16   Temp: 98.1 °F (36.7 °C)  98.4 °F (36.9 °C) 98.4 °F (36.9 °C)   TempSrc: Oral  Oral Oral   SpO2: 98% 98% 99% 96%   Weight:       Height:         Weight: Weight: 165 lb 11.2 oz (75.2 kg)     24 hour intake/output:No intake or output data in the 24 hours ending 01/28/23 1246      General appearance:  No apparent distress, appears stated age and cooperative. HEENT:  Normal cephalic, atraumatic without obvious deformity. Pupils equal, round, and reactive to light. Extra ocular muscles intact. Conjunctivae/corneas clear. Neck: Supple, with full range of motion. No jugular venous distention. Trachea midline. Respiratory:  Normal respiratory effort. Clear to auscultation, bilaterally without Rales/Wheezes/Rhonchi. Cardiovascular:  Regular rate and rhythm with normal S1/S2 without murmurs, rubs or gallops. Abdomen: Soft, non-tender, non-distended with normal bowel sounds. Musculoskeletal:  No clubbing, cyanosis or edema bilaterally. Full range of motion without deformity.   Skin: Skin color, texture, turgor normal.  No rashes or lesions. Neurologic:  Neurovascularly intact without any focal sensory/motor deficits. Cranial nerves: II-XII intact, grossly non-focal.  Psychiatric:  Alert and oriented, thought content appropriate, normal insight  Capillary Refill: Brisk,< 3 seconds   Peripheral Pulses: +2 palpable, equal bilaterally       Labs: For convenience and continuity at follow-up the following most recent labs are provided:      CBC:    Lab Results   Component Value Date/Time    WBC 5.3 01/28/2023 03:56 AM    HGB 13.7 01/28/2023 03:56 AM    HCT 42.9 01/28/2023 03:56 AM     01/28/2023 03:56 AM       Renal:    Lab Results   Component Value Date/Time     01/27/2023 09:40 PM    K 3.8 01/27/2023 09:40 PM     01/27/2023 09:40 PM    CO2 24 01/27/2023 09:40 PM    BUN 12 01/27/2023 09:40 PM    CREATININE 0.5 01/27/2023 09:40 PM    CALCIUM 9.4 01/27/2023 09:40 PM         Significant Diagnostic Studies    Radiology:   MRI brain without contrast   Final Result       1. No evidence of an acute infarct. 2. Mucosal thickening in the paranasal sinuses. **This report has been created using voice recognition software. It may contain minor errors which are inherent in voice recognition technology. **      Final report electronically signed by Dr. Nichol Grissom on 1/28/2023 12:14 PM      XR CHEST PORTABLE   Final Result   1. No acute disease. This document has been electronically signed by: Elmer Rae M.D. on    01/27/2023 10:03 PM      CTA HEAD W WO CONTRAST (CODE STROKE)   Final Result   1. There is a short segment of high-grade stenosis within 1 of the left M2    branches. 2. The proximal portions of the posterior cerebral arteries have a beaded    appearance. This raises the question of vasculitis. 3. A short segment of mild to moderate grade narrowing is noted in the    proximal right P1 segment. 4. Consultation with a neurointensivist is advised.       This document has been electronically signed by: Vale Lyels M.D. on    01/27/2023 10:25 PM      All CTs at this facility use dose modulation techniques and iterative    reconstructions, and/or weight-based dosing   when appropriate to reduce radiation to a low as reasonably achievable. 3D Post-processing was performed on this study. CTA NECK W WO CONTRAST (CODE STROKE)   Final Result   1. Mild grade stenosis of the proximal internal carotid arteries. 2. Calcific plaque causes high-grade stenosis of the proximal left    vertebral artery. 3. Additional findings are detailed above. This document has been electronically signed by: Vale Lyles M.D. on    01/27/2023 10:12 PM      All CTs at this facility use dose modulation techniques and iterative    reconstructions, and/or weight-based dosing   when appropriate to reduce radiation to a low as reasonably achievable. Carotid stenosis and measurements are in accordance with NASCET criteria. 3D Post-processing was performed on this study. CT HEAD WO CONTRAST   Final Result   1. No acute disease in the brain. 2. Paranasal sinus disease is noted. This document has been electronically signed by: Vale Lyles M.D. on    01/27/2023 09:46 PM      All CTs at this facility use dose modulation techniques and iterative    reconstructions, and/or weight-based dosing   when appropriate to reduce radiation to a low as reasonably achievable. Consults:     IP CONSULT TO PHARMACY  PHARMACY TO CHANGE BASE FLUIDS  IP CONSULT TO NEUROLOGY    Disposition: Home  Condition at Discharge: Stable    Code Status:  Full Code     Patient Instructions:    Discharge lab work: Activity: activity as tolerated  Diet: ADULT DIET; Regular      Follow-up visits:   No follow-up provider specified.        Discharge Medications:        Medication List        START taking these medications      aspirin 81 MG chewable tablet  Take 1 tablet by mouth daily  Start taking on: January 29, 2023     atorvastatin 80 MG tablet  Commonly known as: LIPITOR  Take 1 tablet by mouth nightly     clopidogrel 75 MG tablet  Commonly known as: PLAVIX  Take 1 tablet by mouth daily  Start taking on: January 29, 2023            STOP taking these medications      tiZANidine 2 MG tablet  Commonly known as: Crista [place]               Where to Get Your Medications        These medications were sent to [address]  [address], [address] [facility]      Phone: [phone]   aspirin 81 MG chewable tablet  atorvastatin 80 MG tablet  clopidogrel 75 MG tablet         Time Spent on discharge is more than 45 minutes in the examination, evaluation, counseling and review of medications and discharge plan. Signed: Thank you No primary care provider on file. for the opportunity to be involved in this patient's care.     Electronically signed by Lalitha Tineo MD on 1/28/2023 at 12:46 PM

## 2023-01-28 NOTE — PROGRESS NOTES
This nurse completed admission requirements with patient and educated them how to use their call light to report any change in condition to bedside nursing staff. Patient stated he understood.

## 2023-01-28 NOTE — ED NOTES
ED to inpatient nurses report    Chief Complaint   Patient presents with    Facial Droop      Present to ED from home  LOC: alert and orientated to name, place, date  Vital signs   Vitals:    01/27/23 2125 01/27/23 2125 01/27/23 2223 01/27/23 2255   BP: (!) 155/103  (!) 174/99 139/86   Pulse: 87  83 70   Resp: 17  19 20   Temp:       TempSrc:       SpO2: 98%  97% 98%   Weight: 165 lb 11.2 oz (75.2 kg) 165 lb 11.2 oz (75.2 kg)     Height: 5' 7\" (1.702 m)         Oxygen Baseline 0    Current needs required 0   LDAs:   Peripheral IV 01/27/23 Left Forearm (Active)   Site Assessment Clean, dry & intact 01/27/23 2124   Line Status Blood return noted 01/27/23 2124   Phlebitis Assessment No symptoms 01/27/23 2124   Infiltration Assessment 0 01/27/23 2124   Dressing Status Clean, dry & intact 01/27/23 2124     Mobility: Independent  Pending ED orders: none  Present condition: none      C-SSRS Risk of Suicide: No Risk  Swallow Screening    Preferred Language: English     Electronically signed by Dario Marmolejo RN on 1/27/2023 at 11:07 PM       Dario Marmolejo RN  01/27/23 9458

## 2023-01-28 NOTE — H&P
Hospitalist - History & Physical      Patient: David Spear    Unit/Bed:04/004A  YOB: 1960  MRN: [id number]   Acct: [de-identified]   PCP: No primary care provider on file. Assessment and Plan:        Acute cerebrovascular accident:   CTA:   High-grade stenosis of one left M2 branch  Mild-grade stenosis of internal carotid arteries  High-grade stenosis of left vertebral artery  NIHSS: 0  Pending MRI  Consult neurology  NPO, consult SLP for swallow screen  Telemetry, NIHSS/Neuro checks, swallow screen before meals  Permissive HTN  Consider lipid panel and start statin therapy as PO permits  ASA, Plavix  Respiratory tract infection:   CXR: Negative  CT head: paranasal sinus disease noted  Appears viral, uncomplicated  Monitor  Incentive spirometry  Hyperglycemia:   Uncontrolled, >200 at presentation  Indicative of new diabetes mellitus  HbA1C  POCT glucose q6hr  Initiate sliding scale insulin when glucose >180 per CVA guidelines  Hypoglycemic protocol  Tobacco use:   Encourage cessation  History of constipation:   Continue home metamucil    CC:  Slurred speech    HPI: Patient is a 58 y.o. male with no past medical history who presents for evaluation of slurred speech. Patient states that he had right-sided numbness/weakness and facial droop today at 1000 which resolved after a couple hours and 650 mg home aspirin. He noticed a recurrence of symptoms at 2000 which included mumbled speech, right-sided facial droop, and general weakness but no discernable sided extremity deficit. He states that he has a productive cough for the past few weeks, but never had fevers, fatigue, or limited physical activity. Patient is otherwise healthy, active, no chronic medical history, takes no medications, and does not have a family history of clotting disorders. Patient is being admitted for further evaluation of acute cerebrovascular accident. ROS: Review of Systems   Constitutional: Negative.     HENT: Positive for congestion, rhinorrhea and sinus pressure. Eyes: Negative. Respiratory:  Positive for cough. Cardiovascular: Negative. Gastrointestinal: Negative. Endocrine: Negative. Genitourinary: Negative. Musculoskeletal: Negative. Skin: Negative. Allergic/Immunologic: Negative. Neurological: Negative. Hematological: Negative. Psychiatric/Behavioral: Negative. PMH:  History reviewed. No pertinent past medical history. SHX:    Social History     Socioeconomic History    Marital status:      Spouse name: Not on file    Number of children: Not on file    Years of education: Not on file    Highest education level: Not on file   Occupational History    Not on file   Tobacco Use    Smoking status: Every Day     Packs/day: 0.50     Types: Cigarettes    Smokeless tobacco: Not on file   Substance and Sexual Activity    Alcohol use: Yes     Alcohol/week: 3.0 standard drinks     Types: 3 Cans of beer per week     Comment: daily    Drug use: Not on file    Sexual activity: Not on file   Other Topics Concern    Not on file   Social History Narrative    Not on file     Social Determinants of Health     Financial Resource Strain: Not on file   Food Insecurity: Not on file   Transportation Needs: Not on file   Physical Activity: Not on file   Stress: Not on file   Social Connections: Not on file   Intimate Partner Violence: Not on file   Housing Stability: Not on file     FHX: History reviewed. No pertinent family history. Allergies: No Known Allergies  Medications:       sodium chloride  1,000 mL IntraVENous Once        No current facility-administered medications on file prior to encounter.      Current Outpatient Medications on File Prior to Encounter   Medication Sig Dispense Refill    tiZANidine (ZANAFLEX) 2 MG tablet Take 1 tablet by mouth 3 times daily as needed (back pain) 30 tablet 0         Labs:   Recent Results (from the past 24 hour(s))   POCT Glucose    Collection Time: 01/27/23  9:21 PM   Result Value Ref Range    POC Glucose 206 (H) 70 - 108 mg/dl   EKG 12 Lead    Collection Time: 01/27/23  9:31 PM   Result Value Ref Range    Ventricular Rate 83 BPM    Atrial Rate 83 BPM    P-R Interval 144 ms    QRS Duration 86 ms    Q-T Interval 390 ms    QTc Calculation (Bazett) 458 ms    P Axis 12 degrees    R Axis 18 degrees    T Axis 65 degrees   CBC with Auto Differential    Collection Time: 01/27/23  9:40 PM   Result Value Ref Range    WBC 7.4 4.8 - 10.8 thou/mm3    RBC 4.80 4.70 - 6.10 mill/mm3    Hemoglobin 14.7 14.0 - 18.0 gm/dl    Hematocrit 43.9 42.0 - 52.0 %    MCV 91.5 80.0 - 94.0 fL    MCH 30.6 26.0 - 33.0 pg    MCHC 33.5 32.2 - 35.5 gm/dl    RDW-CV 13.1 11.5 - 14.5 %    RDW-SD 43.9 35.0 - 45.0 fL    Platelets 902 937 - 255 thou/mm3    MPV 11.0 9.4 - 12.4 fL    Seg Neutrophils 46.4 %    Lymphocytes 36.1 %    Monocytes 10.2 %    Eosinophils 6.4 %    Basophils 0.8 %    Immature Granulocytes 0.1 %    Segs Absolute 3.4 1.8 - 7.7 thou/mm3    Lymphocytes Absolute 2.7 1.0 - 4.8 thou/mm3    Monocytes Absolute 0.8 0.4 - 1.3 thou/mm3    Eosinophils Absolute 0.5 (H) 0.0 - 0.4 thou/mm3    Basophils Absolute 0.1 0.0 - 0.1 thou/mm3    Immature Grans (Abs) 0.01 0.00 - 0.07 thou/mm3    nRBC 0 /100 wbc   Comprehensive Metabolic Panel w/ Reflex to MG    Collection Time: 01/27/23  9:40 PM   Result Value Ref Range    Glucose 163 (H) 70 - 108 mg/dL    Creatinine 0.5 0.4 - 1.2 mg/dL    BUN 12 7 - 22 mg/dL    Sodium 141 135 - 145 meq/L    Potassium reflex Magnesium 3.8 3.5 - 5.2 meq/L    Chloride 104 98 - 111 meq/L    CO2 24 23 - 33 meq/L    Calcium 9.4 8.5 - 10.5 mg/dL    AST 27 5 - 40 U/L    Alkaline Phosphatase 67 38 - 126 U/L    Total Protein 6.7 6.1 - 8.0 g/dL    Albumin 4.4 3.5 - 5.1 g/dL    Total Bilirubin 0.5 0.3 - 1.2 mg/dL    ALT 18 11 - 66 U/L   Troponin    Collection Time: 01/27/23  9:40 PM   Result Value Ref Range    Troponin T < 0.010 ng/ml   Protime-INR    Collection Time: 01/27/23 9:40 PM   Result Value Ref Range    INR 0.86 0.85 - 1.13   Anion Gap    Collection Time: 01/27/23  9:40 PM   Result Value Ref Range    Anion Gap 13.0 8.0 - 16.0 meq/L   Glomerular Filtration Rate, Estimated    Collection Time: 01/27/23  9:40 PM   Result Value Ref Range    Est, Glom Filt Rate >60 >60 ml/min/1.73m2   Osmolality    Collection Time: 01/27/23  9:40 PM   Result Value Ref Range    Osmolality Calc 284.6 275.0 - 300.0 mOsmol/kg   POCT Creatinine    Collection Time: 01/27/23  9:46 PM   Result Value Ref Range    POC CREATININE WHOLE BLOOD 0.6 0.5 - 1.2 mg/dl         Vital Signs: T: 97.5 F P: 70 RR: 20 B/P: 138/86: FiO2: RA: O2 Sat:98: I/O: No intake or output data in the 24 hours ending 01/27/23 2307      General:   Patient in no acute distress resting comfortably in bed. No obvious facial droop. Congenital cleft lip noted. HEENT:  normocephalic and atraumatic. No scleral icterus. PEARLA, mucous membranes moist  Neck: supple. Trachea midline. No JVD. Full ROM, no meningismus. Lungs: clear to auscultation except right upper lobe with mild rhonchi. No retractions, no accessory muscle use. Cardiac: RRR, no murmur, 2+ pulses  Abdomen: soft. Nontender. Bowel sounds present  Extremities:  No clubbing, cyanosis x 4, no edema. No weakness bilaterally   Vasculature: capillary refill < 3 seconds. Skin:  warm and dry. no visible rashes  Psych:  Alert and oriented x3. Affect appropriate  Lymph:  No supraclavicular adenopathy. Neurologic:  CN II-XII grossly intact. No focal deficit. Data: (All radiographs, tracings, PFTs, and imaging are personally viewed and interpreted unless otherwise noted).      EKG: rhythm: normal sinus rhythm and premature ventricular contractions, rate=83 bpm, ln=886 ms, qrs=86 ms, bd=977 ms, axis=12 degrees      Electronically signed by  Neal Dueñas

## 2023-01-28 NOTE — ED TRIAGE NOTES
Patient presents to ED from home with complaints of right sided facial droop and slurred speech. Patient states that around 1000 this morning he started having difficulty with holding on to things with right hand, states that it continued for a few hours then resolved. Patient states that he was at home having dinner around [id number] and started having same right sided weakness and noticed that liquids were falling out of the corner of his mouth on the right side, and stated that he was having difficulty with words and was \"mumbling\" on the way to the hospital. Patient with obvious R side facial droop during triage. Patient to CT.

## 2023-01-28 NOTE — PROGRESS NOTES
Reviewed AVS with patient. Verbalized understanding. Prescriptions were sent to patients pharmacy of choice. Patient to be discharged with Holter monitor for 30 days. Removed IV.

## 2023-01-28 NOTE — PROGRESS NOTES
Hospitalist Progress Note    Patient:  Fransico Owens      Unit/Bed:4A-18/018-A    YOB: 1960    MRN: [id number]       Acct: [de-identified]     PCP: No primary care provider on file. Date of Admission: 1/27/2023    Assessment/Plan:    TIA vs. Acute cerebrovascular accident:   CTA:   High-grade stenosis of one left M2 branch  Mild-grade stenosis of internal carotid arteries  High-grade stenosis of left vertebral artery  Posterior cerebral arteries have a beaded appearance, suggestive of possible vasculitis, ordered more labs. NIHSS: 0  Pending MRI brain without contrast  TTE  Consult neurology  NPO, consult SLP for swallow screen - AHA diet when resumed  Telemetry, NIHSS/Neuro checks, swallow screen before meals  Permissive HTN - 140-180mmHg  Consider lipid panel and start statin therapy as PO permits  ASA, Plavix, high intensity statin therapy  URI:   CXR: Negative  CT head: paranasal sinus disease noted  Appears viral, uncomplicated  Monitor  Incentive spirometry  Obtain Covid/flu swab  Hyperglycemia:   Uncontrolled, >200 at presentation  Indicative of new diabetes mellitus  HbA1C  POCT glucose q6hr  Hypoglycemic protocol  Tobacco use:   Encourage cessation  History of constipation:   Continue home metamucil        Expected discharge date:  tbd    Disposition:    [] Home       [] TCU       [] Rehab       [] Psych       [] SNF       [] [facility]       [] Other-    Chief Complaint: slurred speech    Hospital Course:   \"Patient is a 58 y.o. male with no past medical history who presents for evaluation of slurred speech. Patient states that he had right-sided numbness/weakness and facial droop today at 1000 which resolved after a couple hours and 650 mg home aspirin. He noticed a recurrence of symptoms at 2000 which included mumbled speech, right-sided facial droop, and general weakness but no discernable sided extremity deficit.  He states that he has a productive cough for the past few weeks, but never had fevers, fatigue, or limited physical activity. Patient is otherwise healthy, active, no chronic medical history, takes no medications, and does not have a family history of clotting disorders. Patient does have a family history of CVA in his mother at age 80. Patient is being admitted for further evaluation of acute cerebrovascular accident. \"    Subjective (past 24 hours):   Patient was seen and evaluated this morning. AAOx3. Reports his back to his usual state of health. Has no complaints. Patient denied any weakness, sensory deficits. Denies chest pain, fever, headache. ROS (12 point review of systems completed. Pertinent positives noted. Otherwise ROS is negative). Medications:  Reviewed    Infusion Medications    dextrose      sodium chloride 75 mL/hr at 01/28/23 0150     Scheduled Medications    insulin lispro  0-4 Units SubCUTAneous TID WC    insulin lispro  0-4 Units SubCUTAneous Nightly    [Held by provider] nicotine  1 patch TransDERmal Daily    aspirin  81 mg Oral Daily    enoxaparin  40 mg SubCUTAneous Daily    atorvastatin  80 mg Oral Nightly    clopidogrel  75 mg Oral Daily     PRN Meds: glucose, dextrose bolus **OR** dextrose bolus, glucagon (rDNA), dextrose, ondansetron **OR** ondansetron, polyethylene glycol, perflutren lipid microspheres, acetaminophen **OR** acetaminophen    No intake or output data in the 24 hours ending 01/28/23 1045    Diet:  ADULT DIET; Regular    Exam:  /88   Pulse 70   Temp 98.4 °F (36.9 °C) (Oral)   Resp 18   Ht 5' 7\" (1.702 m)   Wt 165 lb 11.2 oz (75.2 kg)   SpO2 99%   BMI 25.95 kg/m²     General appearance: No apparent distress, appears stated age and cooperative. HEENT: Pupils equal, round, and reactive to light. Conjunctivae/corneas clear. Neck: Supple, with full range of motion. No jugular venous distention. Trachea midline. Respiratory:  Normal respiratory effort.  Clear to auscultation, bilaterally without Rales/Wheezes/Rhonchi. Cardiovascular: Regular rate and rhythm with normal S1/S2 without murmurs, rubs or gallops. Abdomen: Soft, non-tender, non-distended with normal bowel sounds. Musculoskeletal: passive and active ROM x 4 extremities. Skin: Skin color, texture, turgor normal.  No rashes or lesions. Neurologic:  Neurovascularly intact without any focal sensory/motor deficits. Psychiatric: Alert and oriented, thought content appropriate, normal insight  Capillary Refill: Brisk,< 3 seconds   Peripheral Pulses: +2 palpable, equal bilaterally       Labs:   Recent Labs     01/27/23 2140 01/28/23  0356   WBC 7.4 5.3   HGB 14.7 13.7*   HCT 43.9 42.9    140     Recent Labs     01/27/23 2140      K 3.8      CO2 24   BUN 12   CREATININE 0.5   CALCIUM 9.4     Recent Labs     01/27/23 2140   AST 27   ALT 18   BILITOT 0.5   ALKPHOS 67     Recent Labs     01/27/23 2140   INR 0.86     No results for input(s): Adonica Hoose in the last 72 hours. Microbiology:      Urinalysis:      Lab Results   Component Value Date/Time    NITRU NEGATIVE 10/15/2022 06:20 PM    BLOODU NEGATIVE 10/15/2022 06:20 PM    GLUCOSEU NEGATIVE 10/15/2022 06:20 PM       Radiology:  XR CHEST PORTABLE   Final Result   1. No acute disease. This document has been electronically signed by: Dennise Councilman, M.D. on    01/27/2023 10:03 PM      CTA HEAD W WO CONTRAST (CODE STROKE)   Final Result   1. There is a short segment of high-grade stenosis within 1 of the left M2    branches. 2. The proximal portions of the posterior cerebral arteries have a beaded    appearance. This raises the question of vasculitis. 3. A short segment of mild to moderate grade narrowing is noted in the    proximal right P1 segment. 4. Consultation with a neurointensivist is advised.       This document has been electronically signed by: Dennise Councilman, M.D. on    01/27/2023 10:25 PM      All CTs at this facility use dose modulation techniques and iterative    reconstructions, and/or weight-based dosing   when appropriate to reduce radiation to a low as reasonably achievable. 3D Post-processing was performed on this study. CTA NECK W WO CONTRAST (CODE STROKE)   Final Result   1. Mild grade stenosis of the proximal internal carotid arteries. 2. Calcific plaque causes high-grade stenosis of the proximal left    vertebral artery. 3. Additional findings are detailed above. This document has been electronically signed by: Bonilla Russell M.D. on    01/27/2023 10:12 PM      All CTs at this facility use dose modulation techniques and iterative    reconstructions, and/or weight-based dosing   when appropriate to reduce radiation to a low as reasonably achievable. Carotid stenosis and measurements are in accordance with NASCET criteria. 3D Post-processing was performed on this study. CT HEAD WO CONTRAST   Final Result   1. No acute disease in the brain. 2. Paranasal sinus disease is noted. This document has been electronically signed by: Bonilla Russell M.D. on    01/27/2023 09:46 PM      All CTs at this facility use dose modulation techniques and iterative    reconstructions, and/or weight-based dosing   when appropriate to reduce radiation to a low as reasonably achievable.       MRI brain without contrast    (Results Pending)       DVT prophylaxis: [x] Lovenox                                 [] SCDs                                 [] SQ Heparin                                 [] Encourage ambulation           [] Already on Anticoagulation     Code Status: Full Code    PT/OT Eval Status: to see    Tele:   [x] yes             [] no    Electronically signed by Ples Lombard, MD on 1/28/2023 at 10:45 AM

## 2023-01-30 LAB
ANCA AB PATTERN SER IF-IMP: NORMAL
ANCA IGG TITR SER IF: NORMAL {TITER}
MYELOPEROXIDASE AB SER-ACNC: 0 AU/ML (ref 0–19)
NUCLEAR IGG SER QL IA: NORMAL
PROTEINASE3 AB SER-ACNC: 0 AU/ML (ref 0–19)

## 2023-03-08 DIAGNOSIS — Z01.818 PRE-PROCEDURAL EXAMINATION: Primary | ICD-10-CM

## 2023-05-02 ENCOUNTER — TELEPHONE (OUTPATIENT)
Dept: NEUROLOGY | Age: 63
End: 2023-05-02

## 2023-05-02 NOTE — TELEPHONE ENCOUNTER
Called pt regarding no show to CTA scheduled 4-. Pt was unaware of this appt and stated he didn't know of follow up appt either. When asking if he would like me to reschedule, he explained his insurance covered very little of his hospital stay and does not want to reschedule until he has better insurance. I gave pt my phone number and explained to reach out to me if he needs anything at all or if he changes his mind regarding rescheduling.